# Patient Record
Sex: FEMALE | Race: WHITE | NOT HISPANIC OR LATINO | Employment: UNEMPLOYED | URBAN - NONMETROPOLITAN AREA
[De-identification: names, ages, dates, MRNs, and addresses within clinical notes are randomized per-mention and may not be internally consistent; named-entity substitution may affect disease eponyms.]

---
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
## 2023-06-09 ENCOUNTER — TRANSCRIBE ORDERS (OUTPATIENT)
Dept: ADMINISTRATIVE | Facility: HOSPITAL | Age: 18
End: 2023-06-09

## 2023-06-09 DIAGNOSIS — R10.9 ABDOMINAL PAIN, UNSPECIFIED ABDOMINAL LOCATION: ICD-10-CM

## 2023-06-09 DIAGNOSIS — R11.0 NAUSEA: Primary | ICD-10-CM

## 2023-08-02 ENCOUNTER — HOSPITAL ENCOUNTER (OUTPATIENT)
Dept: ULTRASOUND IMAGING | Facility: HOSPITAL | Age: 18
Discharge: HOME | End: 2023-08-02
Admitting: FAMILY MEDICINE
Payer: MEDICAID

## 2023-08-02 DIAGNOSIS — R10.9 ABDOMINAL PAIN, UNSPECIFIED ABDOMINAL LOCATION: ICD-10-CM

## 2023-08-02 DIAGNOSIS — R11.0 NAUSEA: ICD-10-CM

## 2023-08-02 PROCEDURE — 76705 ECHO EXAM OF ABDOMEN: CPT

## 2023-08-17 ENCOUNTER — OFFICE VISIT (OUTPATIENT)
Dept: GASTROENTEROLOGY | Facility: CLINIC | Age: 18
End: 2023-08-17
Payer: MEDICAID

## 2023-08-17 VITALS
HEIGHT: 63 IN | DIASTOLIC BLOOD PRESSURE: 62 MMHG | HEART RATE: 74 BPM | WEIGHT: 114 LBS | BODY MASS INDEX: 20.2 KG/M2 | RESPIRATION RATE: 18 BRPM | OXYGEN SATURATION: 98 % | SYSTOLIC BLOOD PRESSURE: 94 MMHG | TEMPERATURE: 98.3 F

## 2023-08-17 DIAGNOSIS — R11.0 NAUSEA: Chronic | ICD-10-CM

## 2023-08-17 DIAGNOSIS — R10.13 EPIGASTRIC PAIN: Primary | Chronic | ICD-10-CM

## 2023-08-17 PROCEDURE — 99204 OFFICE O/P NEW MOD 45 MIN: CPT | Performed by: NURSE PRACTITIONER

## 2023-08-17 PROCEDURE — 1159F MED LIST DOCD IN RCRD: CPT | Performed by: NURSE PRACTITIONER

## 2023-08-17 PROCEDURE — 1160F RVW MEDS BY RX/DR IN RCRD: CPT | Performed by: NURSE PRACTITIONER

## 2023-08-17 RX ORDER — ESCITALOPRAM OXALATE 20 MG/1
1 TABLET ORAL DAILY
COMMUNITY
Start: 2023-08-04

## 2023-08-17 RX ORDER — PANTOPRAZOLE SODIUM 40 MG/1
40 TABLET, DELAYED RELEASE ORAL DAILY
COMMUNITY

## 2023-08-17 RX ORDER — FAMOTIDINE 40 MG/1
1 TABLET, FILM COATED ORAL NIGHTLY PRN
COMMUNITY
Start: 2023-07-10

## 2023-08-17 NOTE — PATIENT INSTRUCTIONS
Antireflux measures: Avoid fried, fatty foods, alcohol, chocolate, coffee, tea,  soft drinks, all carbonated beverages (including sparkling water), peppermint and spearmint, spicy foods, tomatoes and tomato based foods, onions, peppers, and garlic.   Other antireflux measures include weight reduction if overweight, avoiding tight clothing around the abdomen, elevating the head of the bed 6 inches with blocks under the head board, and don't drink or eat before going to bed and avoid lying down immediately after meals.  Pantoprazole 40 mg 1 by mouth in the am 30 minutes before breakfast.  Patient declines Zofran at this time.   Follow up: 6 months or sooner if needed

## 2023-08-17 NOTE — PROGRESS NOTES
New Patient Consult      Date: 2023   Patient Name: Antonella Tamayo  MRN: [id number]  : 2005     Primary Care Provider: Morteza Alberto MD    Chief Complaint   Patient presents with    Abdominal Pain     History of Present Illness: Antonella Tamayo is a 17 y.o. female who is here today to establish care with gastroenterology for abdominal pain.     She has a history of epigastric pain that started 4 months ago that has now improved. She was started on Pantoprazole 40 mg once a day in May and her symptoms are much better.  She is now having the pain about once a month that may last 1 day or so. Eating certain foods, such as spicy foods, made symptoms worse. She had nausea associated with the pain, no vomiting. Denies reflux. No history of reflux. Denies any GI bleeding. She states she had a negative H. Pylori breath test at her PCP but we do not have those results. Denies any NSAID use.     Denies constipation, diarrhea or rectal bleeding. No family history of GI malignancy.     Subjective      Past Medical History:   Diagnosis Date    Asthma     REPORTS NO USE OF INHALERS. GRANDMOTHER REPORTS NO S/S SINCE 3 YEARS OF AGE.    GERD (gastroesophageal reflux disease)     Hypoglycemia     Meningitis 2015    Sickle cell trait     Spinal headache     AFTER SPINAL TAP IN  (HX MENINGITIS)    Sprain     left ankle     Past Surgical History:   Procedure Laterality Date    BURSECTOMY Right 2017    Procedure: OPEN PREPATELLAR BURSECTOMY AND REPAIR RIGHT;  Surgeon: Jevon Braga MD;  Location: [facility];  Service:     EAR TUBES      OTHER SURGICAL HISTORY      SPINAL TAP    TYMPANOPLASTY Right     X2     Family History   Problem Relation Age of Onset    Scoliosis Maternal Grandfather     Osteoarthritis Other     Osteoporosis Other     Asthma Other     COPD Other     Abnormal EKG Other     Colon cancer Neg Hx      Social History     Socioeconomic History    Marital status:  Single   Tobacco Use    Smoking status: Never     Passive exposure: Never    Smokeless tobacco: Never   Vaping Use    Vaping Use: Never used   Substance and Sexual Activity    Alcohol use: No    Drug use: No    Sexual activity: Never       Current Outpatient Medications:     escitalopram (LEXAPRO) 20 MG tablet, Take 1 tablet by mouth Daily., Disp: , Rfl:     famotidine (PEPCID) 40 MG tablet, Take 1 tablet by mouth At Night As Needed for Indigestion., Disp: , Rfl:     pantoprazole (PROTONIX) 40 MG EC tablet, Take 1 tablet by mouth Daily., Disp: , Rfl:    No Known Allergies  The following portions of the patient's history were reviewed and updated as appropriate: allergies, current medications, past family history, past medical history, past social history, past surgical history and problem list.    Objective     Physical Exam  Vitals and nursing note reviewed.   Constitutional:       General: She is not in acute distress.     Appearance: Normal appearance. She is well-developed.   HENT:      Head: Normocephalic and atraumatic.      Mouth/Throat:      Mouth: Mucous membranes are not pale, not dry and not cyanotic.   Eyes:      General: Lids are normal.   Neck:      Trachea: Trachea normal.   Cardiovascular:      Rate and Rhythm: Normal rate.   Pulmonary:      Effort: Pulmonary effort is normal. No respiratory distress.      Breath sounds: Normal breath sounds.   Abdominal:      General: Bowel sounds are normal.      Palpations: Abdomen is soft. There is no mass.      Tenderness: There is no abdominal tenderness.      Hernia: No hernia is present.   Skin:     General: Skin is warm and dry.   Neurological:      Mental Status: She is alert and oriented to person, place, and time.   Psychiatric:         Mood and Affect: Mood normal.         Speech: Speech normal.         Behavior: Behavior normal. Behavior is cooperative.     Vitals:    08/17/23 0854   BP: 94/62   Pulse: 74   Resp: 18   Temp: 98.3 øF (36.8 øC)   SpO2: 98%  "  Weight: 51.7 kg (114 lb)   Height: 160 cm (63\")     Body mass index is 20.19 kg/mý.     Results Review:   I have reviewed the patient's new clinical and imaging results.    No visits with results within 90 Day(s) from this visit.   Latest known visit with results is:   Admission on 04/27/2017, Discharged on 04/27/2017   Component Date Value Ref Range Status    HCG, Urine QL 04/27/2017 Negative  Negative Final    Case Report 04/27/2017    Final                    Value:Surgical Pathology Report                         Case: [id number]                                  Authorizing Provider:  Jevon Braga MD      Collected:           04/27/2017 09:51 AM          Ordering Location:     [facility] OR Received:            04/27/2017 10:32 AM          Pathologist:           Porfirio Nielsen MD                                                         Specimen:    Knee, Right, right patella bursa injury                                                    Final Diagnosis 04/27/2017    Final                    Value:This result contains rich text formatting which cannot be displayed here.      US Gallbladder     Result Date: 8/2/2023  Normal right upper quadrant ultrasound.      I       Assessment / Plan      1. Epigastric pain  2. Nausea  She was started on Pantoprazole 40 mg daily and her symptoms have improved. She may have the pain and nausea once a month if she has spicy foods. Denies any GI bleeding. She states she had negative H. Pylori breath test at PCP office but we do not have those results. US 8/2/2023 unremarkable.  Will obtain copies of lab results.   Continue Pantoprazole 40 mg daily for now. Will decrease dose/discontinue at follow up depending on symptoms.  Patient declines Zofran at this time as she states she does not need it.   EGD in the future if symptoms worsen or has GI bleeding.     Patient Instructions   Antireflux measures: Avoid fried, fatty foods, alcohol, chocolate, coffee, tea, "  soft drinks, all carbonated beverages (including sparkling water), peppermint and spearmint, spicy foods, tomatoes and tomato based foods, onions, peppers, and garlic.   Other antireflux measures include weight reduction if overweight, avoiding tight clothing around the abdomen, elevating the head of the bed 6 inches with blocks under the head board, and don't drink or eat before going to bed and avoid lying down immediately after meals.  Pantoprazole 40 mg 1 by mouth in the am 30 minutes before breakfast.  Patient declines Zofran at this time.   Follow up: 6 months or sooner if needed     Madelyn Osullivan, APRN  8/17/2023    Please note that portions of this note may have been completed with a voice recognition program.

## 2023-09-25 DIAGNOSIS — M25.561 ARTHRALGIA OF RIGHT KNEE: Primary | ICD-10-CM

## 2023-09-26 ENCOUNTER — OFFICE VISIT (OUTPATIENT)
Dept: ORTHOPEDIC SURGERY | Facility: CLINIC | Age: 18
End: 2023-09-26
Payer: MEDICAID

## 2023-09-26 VITALS
BODY MASS INDEX: 19.77 KG/M2 | HEART RATE: 75 BPM | SYSTOLIC BLOOD PRESSURE: 112 MMHG | DIASTOLIC BLOOD PRESSURE: 65 MMHG | WEIGHT: 111.6 LBS | HEIGHT: 63 IN

## 2023-09-26 DIAGNOSIS — M22.2X1 PATELLOFEMORAL DISORDER OF RIGHT KNEE: ICD-10-CM

## 2023-09-26 DIAGNOSIS — R29.898 SENSATION OF KNEE INSTABILITY: Primary | ICD-10-CM

## 2023-09-26 DIAGNOSIS — M25.561 ACUTE PAIN OF RIGHT KNEE: ICD-10-CM

## 2023-09-26 PROCEDURE — 1159F MED LIST DOCD IN RCRD: CPT | Performed by: PHYSICIAN ASSISTANT

## 2023-09-26 PROCEDURE — 1160F RVW MEDS BY RX/DR IN RCRD: CPT | Performed by: PHYSICIAN ASSISTANT

## 2023-09-26 PROCEDURE — 99203 OFFICE O/P NEW LOW 30 MIN: CPT | Performed by: PHYSICIAN ASSISTANT

## 2023-09-26 NOTE — PROGRESS NOTES
Subjective   Patient ID: Antonella Tamayo is a 18 y.o. left hand dominant  female  Pain of the Right Knee (Reports pain with exercise and knee feels unstable, no known new injury )         Pain  Associated symptoms include arthralgias (right knee).   Patient presents as a new patient with complaints of right anterior knee pain that is made worse with leg lifts and exercising.  She often senses instability of the right knee.  No injury or trauma.  No redness or warmth.  Symptoms have been ongoing for the last 6 months.                                                 Past Medical History:   Diagnosis Date    Asthma     REPORTS NO USE OF INHALERS. GRANDMOTHER REPORTS NO S/S SINCE 3 YEARS OF AGE.    GERD (gastroesophageal reflux disease)     Hypoglycemia     Meningitis 2015    Sickle cell trait     Spinal headache     AFTER SPINAL TAP IN 2015 (HX MENINGITIS)    Sprain     left ankle        Past Surgical History:   Procedure Laterality Date    BURSECTOMY Right 4/27/2017    Procedure: OPEN PREPATELLAR BURSECTOMY AND REPAIR RIGHT;  Surgeon: Jevon Braga MD;  Location: [facility];  Service:     EAR TUBES      OTHER SURGICAL HISTORY  2015    SPINAL TAP    TYMPANOPLASTY Right     X2       Family History   Problem Relation Age of Onset    Scoliosis Maternal Grandfather     Osteoarthritis Other     Osteoporosis Other     Asthma Other     COPD Other     Abnormal EKG Other     Colon cancer Neg Hx        Social History     Socioeconomic History    Marital status: Single   Tobacco Use    Smoking status: Never     Passive exposure: Never    Smokeless tobacco: Never   Vaping Use    Vaping Use: Never used   Substance and Sexual Activity    Alcohol use: No    Drug use: No    Sexual activity: Never         Current Outpatient Medications:     escitalopram (LEXAPRO) 20 MG tablet, Take 1 tablet by mouth Daily., Disp: , Rfl:     famotidine (PEPCID) 40 MG tablet, Take 1 tablet by mouth At Night As Needed for Indigestion., Disp: ,  "Rfl:     pantoprazole (PROTONIX) 40 MG EC tablet, Take 1 tablet by mouth Daily., Disp: , Rfl:     No Known Allergies    Review of Systems   Musculoskeletal:  Positive for arthralgias (right knee).     I have reviewed the medical and surgical history, family history, social history, medications, and/or allergies, and the review of systems of this report.    Objective   /65 (BP Location: Right arm, Patient Position: Sitting, Cuff Size: Adult)   Pulse 75   Ht 160 cm (63\")   Wt 50.6 kg (111 lb 9.6 oz)   BMI 19.77 kg/m²    Physical Exam  Vitals and nursing note reviewed.   Constitutional:       Appearance: Normal appearance.   Pulmonary:      Effort: Pulmonary effort is normal.   Musculoskeletal:      Right hip: No deformity. Normal range of motion.      Right knee: Bony tenderness and crepitus present. No deformity, erythema or ecchymosis. No LCL laxity or MCL laxity. Abnormal patellar mobility. Normal alignment and normal meniscus.      Right lower leg: No deformity or tenderness.   Neurological:      Mental Status: She is alert and oriented to person, place, and time.     Right Knee Exam     Range of Motion   Extension:  5   Flexion:  130     Tests   Lachman:  Anterior - trace      Pivot shift: trace    Other   Erythema: absent  Scars: present  Pulse: present         Extremity DVT signs are negative on physical exam with negative Hunter sign, no calf pain, no palpable cords, and no skin tone change   Neurologic Exam     Mental Status   Oriented to person, place, and time.             Assessment & Plan   Independent Review of Radiographic Studies:    X-ray of the right knee 3 view performed in the clinic independently reviewed for the evaluation of knee pain.  No comparison films are available for review.  No acute fracture or dislocation noted.  There is patellofemoral narrowing along the inferior aspect of the patellofemoral joint      Procedures       Diagnoses and all orders for this visit:    1. Sensation " of knee instability (Primary)  -     MRI Knee Right Without Contrast; Future    2. Acute pain of right knee  -     MRI Knee Right Without Contrast; Future    3. Patellofemoral disorder of right knee  -     MRI Knee Right Without Contrast; Future       Orthopedic activities reviewed and patient expressed appreciation  Discussion of orthopedic goals  Risk, benefits, and merits of treatment alternatives reviewed with the patient and questions answered    Recommendations/Plan:  Exercise, medications, injections, other patient advice, and return appointment as noted.  Patient is encouraged to call or return for any issues or concerns.    Due to the patient's complaint of a sensation of instability, will order MRI.  Avoid leg lifts and squats until we obtain the MRI and you have reviewed the results with our clinic.      Patient agreeable to call or return sooner for any concerns.                 EMR Dragon-transcription disclaimer:  This encounter note is an electronic transcription of spoken language to printed text.  Electronic transcription of spoken language may permit erroneous or at times nonsensical words or phrases to be inadvertently transcribed.  Although I have reviewed the note for such errors, some may still exist

## 2023-10-05 ENCOUNTER — PATIENT ROUNDING (BHMG ONLY) (OUTPATIENT)
Dept: ORTHOPEDIC SURGERY | Facility: CLINIC | Age: 18
End: 2023-10-05
Payer: MEDICAID

## 2023-10-05 NOTE — PROGRESS NOTES
A [employer] message has been sent to the patient for patient rounding with [facility].

## 2023-10-30 ENCOUNTER — OFFICE VISIT (OUTPATIENT)
Dept: INTERNAL MEDICINE | Facility: CLINIC | Age: 18
End: 2023-10-30
Payer: MEDICAID

## 2023-10-30 VITALS
DIASTOLIC BLOOD PRESSURE: 62 MMHG | TEMPERATURE: 98.6 F | BODY MASS INDEX: 20.02 KG/M2 | OXYGEN SATURATION: 98 % | SYSTOLIC BLOOD PRESSURE: 112 MMHG | HEART RATE: 79 BPM | HEIGHT: 63 IN | WEIGHT: 113 LBS

## 2023-10-30 DIAGNOSIS — Z30.011 ENCOUNTER FOR INITIAL PRESCRIPTION OF CONTRACEPTIVE PILLS: ICD-10-CM

## 2023-10-30 DIAGNOSIS — Z13.29 SCREENING FOR ENDOCRINE, METABOLIC AND IMMUNITY DISORDER: ICD-10-CM

## 2023-10-30 DIAGNOSIS — Z13.220 SCREENING FOR CHOLESTEROL LEVEL: ICD-10-CM

## 2023-10-30 DIAGNOSIS — Z13.0 SCREENING FOR DEFICIENCY ANEMIA: ICD-10-CM

## 2023-10-30 DIAGNOSIS — Z13.228 SCREENING FOR ENDOCRINE, METABOLIC AND IMMUNITY DISORDER: ICD-10-CM

## 2023-10-30 DIAGNOSIS — Z11.4 SCREENING FOR HIV WITHOUT PRESENCE OF RISK FACTORS: ICD-10-CM

## 2023-10-30 DIAGNOSIS — Z13.0 SCREENING FOR ENDOCRINE, METABOLIC AND IMMUNITY DISORDER: ICD-10-CM

## 2023-10-30 DIAGNOSIS — Z00.00 ANNUAL PHYSICAL EXAM: Primary | ICD-10-CM

## 2023-10-30 DIAGNOSIS — Z76.89 ENCOUNTER TO ESTABLISH CARE: Primary | ICD-10-CM

## 2023-10-30 DIAGNOSIS — Z11.59 ENCOUNTER FOR HEPATITIS C SCREENING TEST FOR LOW RISK PATIENT: ICD-10-CM

## 2023-10-30 LAB
B-HCG UR QL: NEGATIVE
EXPIRATION DATE: NORMAL
INTERNAL NEGATIVE CONTROL: NEGATIVE
INTERNAL POSITIVE CONTROL: POSITIVE
Lab: NORMAL

## 2023-10-30 PROCEDURE — 99213 OFFICE O/P EST LOW 20 MIN: CPT | Performed by: NURSE PRACTITIONER

## 2023-10-30 PROCEDURE — 1160F RVW MEDS BY RX/DR IN RCRD: CPT | Performed by: NURSE PRACTITIONER

## 2023-10-30 PROCEDURE — 81025 URINE PREGNANCY TEST: CPT | Performed by: NURSE PRACTITIONER

## 2023-10-30 PROCEDURE — 1159F MED LIST DOCD IN RCRD: CPT | Performed by: NURSE PRACTITIONER

## 2023-10-30 NOTE — PROGRESS NOTES
Acute Office Visit      Date: 10/30/2023   Patient Name: Antonella Abernathy  : 2005   MRN: [id number]     Chief Complaint:    Chief Complaint   Patient presents with    [facility]     With Kayleen lorenzo    Contraception       History of Present Illness: Antonella Abernathy is a 18 y.o. female presenting to [facility].   Works at [employer] off of [address] in [place]   Wanting to start oral contraceptives. Not interested in nexplanon or IUD.  No personal hx or family history of hematological disorders. She has never had DVT, MI, CVA. Nonsmoker.   Never been on birth control before. Periods are regular. Last period 1 week ago, ended 10/27.  She is sexually active with male partner and wants to take for pregnancy prevention.   Does not have gynecologist. Pap not due until next September when she is 19.     Subjective      Review of Systems:   Pertinent ROS noted in HPI.     I have reviewed the patients family history, social history, past medical history, past surgical history and have updated it as appropriate.     Medications:     Current Outpatient Medications:     escitalopram (LEXAPRO) 20 MG tablet, Take 1 tablet by mouth Daily., Disp: , Rfl:     pantoprazole (PROTONIX) 40 MG EC tablet, Take 1 tablet by mouth Daily., Disp: , Rfl:     Norethin-Eth Estrad-Fe Biphas (LO LOESTRIN FE) 1 MG-10 MCG / 10 MCG tablet, Take 1 tablet by mouth Daily., Disp: 84 tablet, Rfl: 3    Allergies:   No Known Allergies    Objective     Physical Exam  Physical Exam  Vitals and nursing note reviewed.   Constitutional:       General: She is not in acute distress.     Appearance: Normal appearance.   HENT:      Right Ear: External ear normal.      Left Ear: External ear normal.   Eyes:      Extraocular Movements: Extraocular movements intact.      Pupils: Pupils are equal, round, and reactive to light.   Cardiovascular:      Rate and Rhythm: Normal rate and regular rhythm.      Heart sounds: Normal heart  "sounds.   Pulmonary:      Effort: Pulmonary effort is normal.      Breath sounds: Normal breath sounds.   Musculoskeletal:         General: Normal range of motion.      Cervical back: Normal range of motion.   Skin:     General: Skin is dry.   Neurological:      Mental Status: She is alert and oriented to person, place, and time.   Psychiatric:         Mood and Affect: Mood normal.         Thought Content: Thought content normal.         Vital Signs:   Vitals:    10/30/23 1645   BP: 112/62   Pulse: 79   Temp: 98.6 °F (37 °C)   SpO2: 98%   Weight: 51.3 kg (113 lb)   Height: 160 cm (63\")   PainSc: 0-No pain     Body mass index is 20.02 kg/m².    Office Visit on 10/30/2023   Component Date Value Ref Range Status    HCG, Urine, QL 10/30/2023 Negative  Negative Final    Lot Number 10/30/2023 [id number]   Final    Internal Positive Control 10/30/2023 Positive  Positive, Passed Final    Internal Negative Control 10/30/2023 Negative  Negative, Passed Final    Expiration Date 10/30/2023 1/3/25   Final        Assessment / Plan      Assessment/Plan:   Problem List Items Addressed This Visit    None  Visit Diagnoses       Encounter to establish care    -  Primary    Encounter for initial prescription of contraceptive pills        Relevant Medications    Norethin-Eth Estrad-Fe Biphas (LO LOESTRIN FE) 1 MG-10 MCG / 10 MCG tablet    Other Relevant Orders    POCT pregnancy, urine (Completed)            UPT negative. Discussed how to take birth control pills, take at the same time each day.  If one dose is missed take ASAP, does not require a back up method. If 2 or more doses are missed please use back up method such as condoms for at least 7 days.   Some medications can decrease the effectiveness of OCP such as certain antibiotics. A backup method such as condoms during treatment is necessary for adequate pregnancy protection.   Discussed risks of OCPs including deep vein thrombosis, pulmonary embolism, heart attack, and stroke. " Discussed s/s of these conditions and when to be seen emergently.   Discussed with patient birth control does not protect against sexually transmitted diseases and safe sex practices are encouraged.       Follow Up:   Return for Annual.        Kayleen NUNEZ  [facility]

## 2023-11-06 RX ORDER — MECLIZINE HYDROCHLORIDE 25 MG/1
25 TABLET ORAL 3 TIMES DAILY PRN
Qty: 30 TABLET | Refills: 0 | Status: SHIPPED | OUTPATIENT
Start: 2023-11-06

## 2023-11-08 ENCOUNTER — HOSPITAL ENCOUNTER (OUTPATIENT)
Dept: MRI IMAGING | Facility: HOSPITAL | Age: 18
Discharge: HOME | End: 2023-11-08
Admitting: PHYSICIAN ASSISTANT
Payer: COMMERCIAL

## 2023-11-08 DIAGNOSIS — R29.898 SENSATION OF KNEE INSTABILITY: ICD-10-CM

## 2023-11-08 DIAGNOSIS — M22.2X1 PATELLOFEMORAL DISORDER OF RIGHT KNEE: ICD-10-CM

## 2023-11-08 DIAGNOSIS — M25.561 ACUTE PAIN OF RIGHT KNEE: ICD-10-CM

## 2023-11-08 PROCEDURE — 73721 MRI JNT OF LWR EXTRE W/O DYE: CPT

## 2023-11-13 ENCOUNTER — TELEPHONE (OUTPATIENT)
Dept: ORTHOPEDIC SURGERY | Facility: CLINIC | Age: 18
End: 2023-11-13
Payer: COMMERCIAL

## 2023-11-13 NOTE — PROGRESS NOTES
Would you mind to let patient know that her MRI of right knee does NOT reveal any abnormalities. Her pain could benefit from Physical therapy. If she is interested in PT, could you place and fax the PT order?  thanks

## 2023-11-18 ENCOUNTER — LAB (OUTPATIENT)
Dept: LAB | Facility: HOSPITAL | Age: 18
End: 2023-11-18
Payer: COMMERCIAL

## 2023-11-18 PROCEDURE — 80061 LIPID PANEL: CPT | Performed by: NURSE PRACTITIONER

## 2023-11-18 PROCEDURE — 87389 HIV-1 AG W/HIV-1&-2 AB AG IA: CPT | Performed by: NURSE PRACTITIONER

## 2023-11-18 PROCEDURE — 80050 GENERAL HEALTH PANEL: CPT | Performed by: NURSE PRACTITIONER

## 2023-11-18 PROCEDURE — 86803 HEPATITIS C AB TEST: CPT | Performed by: NURSE PRACTITIONER

## 2023-11-21 ENCOUNTER — OFFICE VISIT (OUTPATIENT)
Dept: INTERNAL MEDICINE | Facility: CLINIC | Age: 18
End: 2023-11-21
Payer: COMMERCIAL

## 2023-11-21 VITALS
DIASTOLIC BLOOD PRESSURE: 62 MMHG | OXYGEN SATURATION: 98 % | HEIGHT: 63 IN | TEMPERATURE: 98.1 F | HEART RATE: 83 BPM | SYSTOLIC BLOOD PRESSURE: 116 MMHG | BODY MASS INDEX: 20.02 KG/M2 | WEIGHT: 113 LBS

## 2023-11-21 DIAGNOSIS — R10.13 EPIGASTRIC PAIN: ICD-10-CM

## 2023-11-21 DIAGNOSIS — Z30.011 ENCOUNTER FOR INITIAL PRESCRIPTION OF CONTRACEPTIVE PILLS: ICD-10-CM

## 2023-11-21 DIAGNOSIS — R11.0 NAUSEA: ICD-10-CM

## 2023-11-21 DIAGNOSIS — Z00.00 ANNUAL PHYSICAL EXAM: Primary | ICD-10-CM

## 2023-11-21 PROCEDURE — 99395 PREV VISIT EST AGE 18-39: CPT | Performed by: NURSE PRACTITIONER

## 2023-11-21 RX ORDER — ACETAMINOPHEN AND CODEINE PHOSPHATE 120; 12 MG/5ML; MG/5ML
1 SOLUTION ORAL DAILY
Qty: 28 TABLET | Refills: 12 | Status: SHIPPED | OUTPATIENT
Start: 2023-11-21 | End: 2024-11-20

## 2023-11-21 RX ORDER — PANTOPRAZOLE SODIUM 20 MG/1
20 TABLET, DELAYED RELEASE ORAL DAILY
Qty: 90 TABLET | Refills: 0 | Status: SHIPPED | OUTPATIENT
Start: 2023-11-21 | End: 2023-11-22

## 2023-11-21 NOTE — PROGRESS NOTES
Female Physical Note      Date: 2023   Patient Name: Antonella Abernathy  : 2005   MRN: [id number]     Chief Complaint:    Chief Complaint   Patient presents with    Annual Exam    Abdominal Pain     4 out of 5 days last week, thinks it may be from her ulcer.        History of Present Illness: Antonella Abernathy is a 18 y.o. female who is here today for their annual health maintenance and physical.  Epigastric abdominal pain x 1 week. Recently stopped her pantoprazole and a few weeks later started having the epigastric pain again. Hx ulcer. Occurring within 1 hour after meals. Nausea and occasionally vomiting. If she vomits the abdominal pain is relieved. Sometimes hurts into her back. Never had egd but this was discussed before. Has not had HIDA scan. No fever, myalgia, chills, diarrhea, constipation.   Attempted to take ELENI Lo Loestrin and had dizziness, vertigo, mood swings within the initial first couple weeks. When stopped her symptoms resolved. Interested in trying progesterone only pill.   Anxiety is stable on lexapro 20 mg daily.   UTD vision/dental   Declines STI testing  Declines vaccines     Subjective      Review of Systems:   Review of Systems   Gastrointestinal:  Positive for abdominal pain, nausea and vomiting.   All other systems reviewed and are negative.      Past Medical History, Social History, Family History and Care Team were all reviewed with patient and updated as appropriate.     Medications:     Current Outpatient Medications:     pantoprazole (PROTONIX) 20 MG EC tablet, Take 1 tablet by mouth Daily., Disp: 90 tablet, Rfl: 0    escitalopram (LEXAPRO) 20 MG tablet, Take 1 tablet by mouth Daily., Disp: , Rfl:     norethindrone (MICRONOR) 0.35 MG tablet, Take 1 tablet by mouth Daily., Disp: 28 tablet, Rfl: 12    Allergies:   No Known Allergies    Immunizations:  Immunization History   Administered Date(s) Administered    COVID-19 (PFIZER) Purple Cap Monovalent  "03/29/2021    DTaP, Unspecified 2003, 01/16/2004, 03/26/2004, 03/22/2005, 10/03/2007    Fluzone (or Fluarix & Flulaval for VFC) >6mos 05/03/2023    Hep A, 2 Dose 01/10/2019, 02/18/2020    Hep B, Adolescent or Pediatric 2005, 2005, 03/26/2004    HiB 2005, 01/16/2004, 03/26/2004, 03/22/2005    MMR 12/22/2004, 10/03/2007    Meningococcal B,(Bexsero) 02/18/2020    Meningococcal MCV4P (Menactra) 02/18/2020    Meningococcal, Unspecified 10/22/2014    Pneumococcal, Unspecified 2003, 01/16/2004, 06/29/2004, 12/22/2004    Polio, Unspecified 2003, 01/16/2004, 09/21/2006, 10/03/2007    Tdap 10/22/2014    Varicella 03/22/2005, 10/03/2007         Hep C (Age 18-79 once):  previously negative  HIV (Age 15-65 once): previously negative  Lipid panel: done    Ophthalmologist: established  Dentist: established    Tobacco Use: Low Risk  (11/21/2023)    Patient History     Smoking Tobacco Use: Never     Smokeless Tobacco Use: Never     Passive Exposure: Never       Social History     Substance and Sexual Activity   Alcohol Use Yes    Alcohol/week: 2.0 standard drinks of alcohol    Types: 2 Drinks containing 0.5 oz of alcohol per week        Social History     Substance and Sexual Activity   Drug Use No        Diet/Physical activity: counseled on 11/21/23      Sexual Health: using contraception with bf, not attempting pregnancy   Menopause: pre-menopausal  Menstrual Cycles: regular, LMP: 11/19/23    PHQ-2 Depression Screening  Little interest or pleasure in doing things? 0-->not at all   Feeling down, depressed, or hopeless? 0-->not at all   PHQ-2 Total Score 0     PHQ-9 Total Score: 0       Objective     Physical Exam:  Vital Signs:   Vitals:    11/21/23 1605   BP: 116/62   Pulse: 83   Temp: 98.1 °F (36.7 °C)   SpO2: 98%   Weight: 51.3 kg (113 lb)   Height: 160 cm (63\")   PainSc: 0-No pain     Body mass index is 20.02 kg/m².   BMI is within normal parameters. No other follow-up for BMI required.   "     Physical Exam  Vitals and nursing note reviewed.   Constitutional:       General: She is not in acute distress.     Appearance: Normal appearance. She is normal weight.   HENT:      Head: Normocephalic and atraumatic.      Right Ear: Tympanic membrane, ear canal and external ear normal.      Left Ear: Tympanic membrane, ear canal and external ear normal.      Nose: Nose normal.      Mouth/Throat:      Mouth: Mucous membranes are moist.      Pharynx: Oropharynx is clear.   Eyes:      General: No scleral icterus.     Extraocular Movements: Extraocular movements intact.      Conjunctiva/sclera: Conjunctivae normal.      Pupils: Pupils are equal, round, and reactive to light.   Neck:      Thyroid: No thyromegaly.   Cardiovascular:      Rate and Rhythm: Normal rate and regular rhythm.      Pulses: Normal pulses.   Pulmonary:      Effort: Pulmonary effort is normal.      Breath sounds: Normal breath sounds.   Abdominal:      General: Abdomen is flat. Bowel sounds are normal. There is no distension.      Palpations: Abdomen is soft.      Tenderness: There is no abdominal tenderness. There is no guarding.   Musculoskeletal:         General: Normal range of motion.      Cervical back: Normal range of motion and neck supple.   Lymphadenopathy:      Cervical: No cervical adenopathy.   Skin:     General: Skin is warm and dry.      Coloration: Skin is not jaundiced or pale.      Findings: No rash.   Neurological:      Mental Status: She is alert and oriented to person, place, and time.      Cranial Nerves: No cranial nerve deficit.      Motor: No weakness.      Coordination: Coordination normal.      Gait: Gait normal.   Psychiatric:         Mood and Affect: Mood normal.         Behavior: Behavior normal.         Thought Content: Thought content normal.           Assessment / Plan      Assessment/Plan:   Problem List Items Addressed This Visit          Gastrointestinal Abdominal     Epigastric pain    Relevant Medications     pantoprazole (PROTONIX) 20 MG EC tablet    Nausea    Relevant Medications    pantoprazole (PROTONIX) 20 MG EC tablet     Other Visit Diagnoses       Annual physical exam    -  Primary    Encounter for initial prescription of contraceptive pills        Relevant Medications    norethindrone (MICRONOR) 0.35 MG tablet            Healthcare Maintenance:  Counseling provided based on age appropriate USPSTF guidelines.  BMI is within normal parameters. No other follow-up for BMI required.  Labs reviewed with patient and all within normal range.   Reinitiate pantoprazole 20 mg every morning 30 minutes before meals x 6 weeks. If helps more likely GERD/gastric ulcer.  If symptoms persist/worsen consider HIDA scan. Gallbladder US normal.   Failed low dose ELENI Lo Loestrin due to side effects. Start progesterone only, norethidrone daily. Notify/stop if any side effects.   Discussed how to take birth control pills, take at the same time each day.  If one dose is missed take ASAP, does not require a back up method. If 2 or more doses are missed please use back up method such as condoms for at least 7 days.   Some medications can decrease the effectiveness of OCP such as certain antibiotics. A backup method such as condoms during treatment is necessary for adequate pregnancy protection.   Discussed risks of OCPs including deep vein thrombosis, pulmonary embolism, heart attack, and stroke.   Discussed with patient birth control does not protect against sexually transmitted diseases.      Antonella Abernathy voices understanding and acceptance of this advice and will call back with any further questions or concerns. AVS with preventive healthcare tips printed for patient.       Follow Up:   Return in about 1 year (around 11/21/2024) for Annual with pap.      ENRIQUE Sampson  [facility] [place]

## 2023-11-22 RX ORDER — OMEPRAZOLE 20 MG/1
20 CAPSULE, DELAYED RELEASE ORAL DAILY
Qty: 90 CAPSULE | Refills: 0 | Status: SHIPPED | OUTPATIENT
Start: 2023-11-22

## 2023-12-10 DIAGNOSIS — R00.2 PALPITATION: Primary | ICD-10-CM

## 2023-12-18 PROBLEM — R00.2 PALPITATIONS: Status: ACTIVE | Noted: 2023-12-18

## 2023-12-18 NOTE — PROGRESS NOTES
[facility]  Cardiology Consultation Note     Antonella Abernathy  2005  Requesting Provider: ENRIQUE Fry  PCP: Kayleen Moore APRN    ID:  Antonella Abernathy is a 18 y.o. female who resides in [place], [place]    REASON FOR CONSULTATION:    Palpitations  Chest Pain         Dear ENRIQUE Sampson:    Thank you for referring Amy Yanes to me for evaluation of palpitations.  As you know she is a 18-year-old female with no prior cardiac history but history of anxiety/depression and gastric ulcer whom you saw last month.  She reported upper epigastric discomfort and you started her on a PPI.  She tells me this has resolved her symptoms.  She reports having COVID in  and afterwards would have palpitations.  Her episodes would feel like her heart is racing and take her breath.  This eventually went away.  However, for the last few months her palpitations have returned.  They were occurring almost daily.  They would last for a few seconds and take her breath then subside.  She says since her appointment was made with cardiology her palpitations have improved.  She is only had a couple of episodes over the past few weeks.  She denies any lightheadedness, shortness of breath, chest pain, orthopnea, or syncope.  She reports her grandmother  of a pulmonary embolism and her grandfather has hypertension but there is no coronary artery disease noted in the family.  She does not smoke cigarettes.  She has not been to the gym lately but usually go several times a week and walks on the treadmill and lifts light weights.  She does report that when she has been on the treadmill there are times that she has noted by her Apple watch that her heart rate will speed up to 140 bpm briefly then decrease back to 80 and 90 bpm.  She is not symptomatic when this occurs and does not have palpitations when it occurs.  Her Apple watch has never alerted her to an irregular  heart rhythm.  She is currently on birth control pill.  She had recent blood work and thyroid panel, CMP, lipid profile and CBC were all within normal limits.  Her EKG today shows normal sinus rhythm without any abnormalities.      Past Medical History, Past Surgical History, Family history, Social History, and Medications were all reviewed with the patient today and updated as necessary.       Current Outpatient Medications:     escitalopram (LEXAPRO) 20 MG tablet, Take 1 tablet by mouth Daily., Disp: , Rfl:     norethindrone (MICRONOR) 0.35 MG tablet, Take 1 tablet by mouth Daily., Disp: 28 tablet, Rfl: 12    omeprazole (priLOSEC) 20 MG capsule, Take 1 capsule by mouth Daily., Disp: 90 capsule, Rfl: 0    No Known Allergies      Past Medical History:   Diagnosis Date    Arrhythmia     I feel like i have heart palps/flutters    Asthma     REPORTS NO USE OF INHALERS. GRANDMOTHER REPORTS NO S/S SINCE 3 YEARS OF AGE.    GERD (gastroesophageal reflux disease)     Heart murmur     I had one when i was younger.    Hypoglycemia     Meningitis 2015    Peptic ulceration May 2023    Sickle cell trait     Spinal headache     AFTER SPINAL TAP IN 2015 (HX MENINGITIS)    Sprain     left ankle       Past Surgical History:   Procedure Laterality Date    BURSECTOMY Right 04/27/2017    Procedure: OPEN PREPATELLAR BURSECTOMY AND REPAIR RIGHT;  Surgeon: Jevon Braga MD;  Location: [facility];  Service:     EAR TUBES      OTHER SURGICAL HISTORY  2015    SPINAL TAP    TYMPANOPLASTY Right     X2       Family History   Problem Relation Age of Onset    Anxiety disorder Mother     Asthma Mother     Birth defects Brother         2 heart valves instead of 3    Arthritis Maternal Grandmother     Scoliosis Maternal Grandfather     Osteoarthritis Other     Osteoporosis Other     Asthma Other     COPD Other     Abnormal EKG Other     Colon cancer Neg Hx        Social History     Tobacco Use    Smoking status: Never     Passive exposure: Never  "   Smokeless tobacco: Never   Substance Use Topics    Alcohol use: Yes     Alcohol/week: 2.0 standard drinks of alcohol     Types: 2 Drinks containing 0.5 oz of alcohol per week       Review of Systems   Constitutional: Negative for malaise/fatigue.   Eyes:  Negative for vision loss in left eye and vision loss in right eye.   Cardiovascular:  Negative for chest pain, dyspnea on exertion, near-syncope, orthopnea, palpitations, paroxysmal nocturnal dyspnea and syncope.   Musculoskeletal:  Negative for myalgias.   Neurological:  Negative for brief paralysis, excessive daytime sleepiness, focal weakness, numbness, paresthesias and weakness.   All other systems reviewed and are negative.              /58 (BP Location: Right arm, Patient Position: Sitting, Cuff Size: Adult)   Pulse 77   Ht 157.5 cm (62\")   Wt 52.7 kg (116 lb 3.2 oz)   SpO2 98%   BMI 21.25 kg/m²        Physical Exam        ECG 12 Lead    Date/Time: 12/26/2023 11:02 AM  Performed by: Anna Rey APRN    Authorized by: Anna Rey APRN  Comparison: compared with previous ECG   Rhythm: sinus rhythm  BPM: 77    Clinical impression: normal ECG  Comments: QT/QTc 368/416 MS          Lab Results   Component Value Date    CHOL 127 11/18/2023    HDL 59 11/18/2023    LDL 54 11/18/2023    VLDL 14 11/18/2023     Lab Results   Component Value Date    GLUCOSE 89 11/18/2023    BUN 9 11/18/2023    CREATININE 0.93 11/18/2023    EGFR 90.4 11/18/2023    BCR 9.7 11/18/2023    K 4.2 11/18/2023    CO2 25.0 11/18/2023    CALCIUM 9.5 11/18/2023    ALBUMIN 4.5 11/18/2023    BILITOT 0.4 11/18/2023    AST 21 11/18/2023    ALT 12 11/18/2023     Lab Results   Component Value Date    WBC 6.94 11/18/2023    HGB 13.0 11/18/2023    HCT 38.6 11/18/2023    MCV 86.5 11/18/2023     11/18/2023     No results found for: \"HGBA1C\"         Diagnoses and all orders for this visit:    1. Palpitations (Primary)  Assessment & Plan:  Obtain echocardiogram  Defer monitor " for now but if palpitations return, progress, worsen or patient becomes symptomatic we will place 2-week monitor.  She will update us through epic.    Orders:  -     ECG 12 Lead  -     Adult Transthoracic Echo Complete W/ Cont if Necessary Per Protocol; Future                 Obtain echocardiogram today for palpitations  Will defer monitor for now as palpitations appear to have decreased in frequency and have been minimal as of lately.  If they return, progress, worsen or she becomes symptomatic she will contact us via [facility] and we will place 2-week monitor  I will contact patient with results of echocardiogram when available.  Return in about 6 months (around 6/26/2024), or if symptoms worsen or fail to improve.      Anna Rey, ENRIQUE  12/26/23  11:18 EST

## 2023-12-26 ENCOUNTER — OFFICE VISIT (OUTPATIENT)
Dept: CARDIOLOGY | Facility: CLINIC | Age: 18
End: 2023-12-26
Payer: COMMERCIAL

## 2023-12-26 VITALS
OXYGEN SATURATION: 98 % | WEIGHT: 116.2 LBS | DIASTOLIC BLOOD PRESSURE: 58 MMHG | HEIGHT: 62 IN | BODY MASS INDEX: 21.38 KG/M2 | SYSTOLIC BLOOD PRESSURE: 106 MMHG | HEART RATE: 77 BPM

## 2023-12-26 DIAGNOSIS — R00.2 PALPITATIONS: Primary | ICD-10-CM

## 2023-12-26 NOTE — ASSESSMENT & PLAN NOTE
Obtain echocardiogram  Defer monitor for now but if palpitations return, progress, worsen or patient becomes symptomatic we will place 2-week monitor.  She will update us through epic.

## 2024-01-03 ENCOUNTER — PATIENT MESSAGE (OUTPATIENT)
Dept: INTERNAL MEDICINE | Facility: CLINIC | Age: 19
End: 2024-01-03
Payer: COMMERCIAL

## 2024-01-03 RX ORDER — OMEPRAZOLE 20 MG/1
20 CAPSULE, DELAYED RELEASE ORAL DAILY
Qty: 30 CAPSULE | Refills: 0 | Status: SHIPPED | OUTPATIENT
Start: 2024-01-03

## 2024-01-03 RX ORDER — ESCITALOPRAM OXALATE 20 MG/1
20 TABLET ORAL DAILY
Qty: 30 TABLET | Refills: 0 | Status: SHIPPED | OUTPATIENT
Start: 2024-01-03 | End: 2024-01-04 | Stop reason: SDUPTHER

## 2024-01-04 RX ORDER — ESCITALOPRAM OXALATE 20 MG/1
20 TABLET ORAL DAILY
Qty: 90 TABLET | Refills: 0 | Status: SHIPPED | OUTPATIENT
Start: 2024-01-04

## 2024-01-04 NOTE — TELEPHONE ENCOUNTER
From: Antonella Abernathy  To: Kayleen Moore  Sent: 1/3/2024 5:12 PM EST  Subject: Lexapro prescription     According to my insurance, my lexapro cannot be filled at a pharmacy, it has to be mail order so I’m not sure where it needs to be sent

## 2024-01-05 DIAGNOSIS — M22.2X1 PATELLOFEMORAL DISORDER OF RIGHT KNEE: ICD-10-CM

## 2024-01-05 DIAGNOSIS — M25.561 ARTHRALGIA OF RIGHT KNEE: ICD-10-CM

## 2024-01-05 DIAGNOSIS — R29.898 SENSATION OF KNEE INSTABILITY: Primary | ICD-10-CM

## 2024-01-05 DIAGNOSIS — M25.561 ACUTE PAIN OF RIGHT KNEE: ICD-10-CM

## 2024-01-11 RX ORDER — ESCITALOPRAM OXALATE 20 MG/1
20 TABLET ORAL DAILY
Qty: 90 TABLET | Refills: 0 | Status: SHIPPED | OUTPATIENT
Start: 2024-01-11

## 2024-01-26 ENCOUNTER — DOCUMENTATION (OUTPATIENT)
Dept: CARDIOLOGY | Facility: CLINIC | Age: 19
End: 2024-01-26
Payer: COMMERCIAL

## 2024-01-26 NOTE — PROGRESS NOTES
I contacted [facility] because her insurance will not approve her echocardiogram even through peer review.  She reports she is doing ok and still having palpitations but they are rare. She will contact us if they worsen or she develops heart failure symptoms then will get a monitor and send her to Dr Martin office for an echocardiogram at that time.      Anna NUNEZ

## 2024-01-30 DIAGNOSIS — S09.92XA BLUNT TRAUMA OF NOSE, INITIAL ENCOUNTER: Primary | ICD-10-CM

## 2024-01-31 RX ORDER — OMEPRAZOLE 20 MG/1
20 CAPSULE, DELAYED RELEASE ORAL DAILY
Qty: 90 CAPSULE | Refills: 0 | Status: SHIPPED | OUTPATIENT
Start: 2024-01-31

## 2024-02-02 RX ORDER — ESCITALOPRAM OXALATE 20 MG/1
20 TABLET ORAL DAILY
Qty: 90 TABLET | Refills: 1 | Status: SHIPPED | OUTPATIENT
Start: 2024-02-02

## 2024-02-08 ENCOUNTER — OFFICE VISIT (OUTPATIENT)
Dept: GASTROENTEROLOGY | Facility: CLINIC | Age: 19
End: 2024-02-08
Payer: COMMERCIAL

## 2024-02-08 VITALS
HEART RATE: 71 BPM | WEIGHT: 119 LBS | BODY MASS INDEX: 21.9 KG/M2 | OXYGEN SATURATION: 99 % | RESPIRATION RATE: 14 BRPM | DIASTOLIC BLOOD PRESSURE: 64 MMHG | HEIGHT: 62 IN | SYSTOLIC BLOOD PRESSURE: 90 MMHG

## 2024-02-08 DIAGNOSIS — R10.13 EPIGASTRIC PAIN: Primary | Chronic | ICD-10-CM

## 2024-02-08 DIAGNOSIS — R19.7 DIARRHEA, UNSPECIFIED TYPE: ICD-10-CM

## 2024-02-08 DIAGNOSIS — K58.0 IRRITABLE BOWEL SYNDROME WITH DIARRHEA: ICD-10-CM

## 2024-02-08 DIAGNOSIS — R11.0 NAUSEA: Chronic | ICD-10-CM

## 2024-02-08 PROCEDURE — 99214 OFFICE O/P EST MOD 30 MIN: CPT | Performed by: NURSE PRACTITIONER

## 2024-02-08 RX ORDER — DICYCLOMINE HCL 20 MG
20 TABLET ORAL 3 TIMES DAILY PRN
Qty: 30 TABLET | Refills: 1 | Status: SHIPPED | OUTPATIENT
Start: 2024-02-08

## 2024-02-08 RX ORDER — SODIUM CHLORIDE 9 MG/ML
70 INJECTION, SOLUTION INTRAVENOUS CONTINUOUS PRN
OUTPATIENT
Start: 2024-02-08

## 2024-02-08 RX ORDER — ONDANSETRON 4 MG/1
4 TABLET, FILM COATED ORAL EVERY 8 HOURS PRN
Qty: 30 TABLET | Refills: 1 | Status: SHIPPED | OUTPATIENT
Start: 2024-02-08

## 2024-02-08 NOTE — PROGRESS NOTES
Follow Up Note     Date: 2024   Patient Name: Antonella Carroll  MRN: [id number]  : 2005     Primary Care Provider: Kayleen Moore APRN     Chief Complaint   Patient presents with    Abdominal Pain     History of present illness:   2024  Antonella Carroll is a 18 y.o. female who is here today for follow up for abdominal pain. She has been doing better but has had a few episodes since her last visit where she had severe epigastric pain, nausea with vomiting and 1-2 episodes of diarrhea that may last 8-10 hours then resolves. She denies diarrhea at any other time, she typically has 1 soft bowel movement per day. She denies any GI bleeding. The episodes occurred after she had Thanksgiving dinner and after she had eaten jay or steak. Stress will cause the symptoms as well. Her PCP stopped her Pantoprazole 40 mg and changed her to Omeprazole 20 mg. She was taking it daily and stopped it 2 weeks ago. She has been not had any symptoms since she stopped the Omeprazole. Denies any history of weight loss. No family history of IBD.     Interval History:  2023  She has a history of epigastric pain that started 4 months ago that has now improved. She was started on Pantoprazole 40 mg once a day in May and her symptoms are much better.  She is now having the pain about once a month that may last 1 day or so. Eating certain foods, such as spicy foods, made symptoms worse. She had nausea associated with the pain, no vomiting. Denies reflux. No history of reflux. Denies any GI bleeding. She states she had a negative H. Pylori breath test at her PCP but we do not have those results. Denies any NSAID use.      Denies constipation, diarrhea or rectal bleeding. No family history of GI malignancy.     Subjective      Past Medical History:   Diagnosis Date    Arrhythmia     I feel like i have heart palps/flutters    Asthma     REPORTS NO USE OF INHALERS. GRANDMOTHER REPORTS NO S/S SINCE 3 YEARS OF  AGE.    GERD (gastroesophageal reflux disease)     Heart murmur     I had one when i was younger.    Hypoglycemia     Meningitis 2015    Peptic ulceration May 2023    Sickle cell trait     Spinal headache     AFTER SPINAL TAP IN 2015 (HX MENINGITIS)    Sprain     left ankle     Past Surgical History:   Procedure Laterality Date    BURSECTOMY Right 04/27/2017    Procedure: OPEN PREPATELLAR BURSECTOMY AND REPAIR RIGHT;  Surgeon: Jevon Braga MD;  Location: [facility];  Service:     EAR TUBES      OTHER SURGICAL HISTORY  2015    SPINAL TAP    TYMPANOPLASTY Right     X2     Family History   Problem Relation Age of Onset    Anxiety disorder Mother     Asthma Mother     Birth defects Brother         2 heart valves instead of 3    Arthritis Maternal Grandmother     Scoliosis Maternal Grandfather     Osteoarthritis Other     Osteoporosis Other     Asthma Other     COPD Other     Abnormal EKG Other     Colon cancer Neg Hx      Social History     Socioeconomic History    Marital status: Single   Tobacco Use    Smoking status: Never     Passive exposure: Never    Smokeless tobacco: Never   Vaping Use    Vaping Use: Never used   Substance and Sexual Activity    Alcohol use: Yes     Alcohol/week: 2.0 standard drinks of alcohol     Types: 2 Drinks containing 0.5 oz of alcohol per week    Drug use: No    Sexual activity: Yes     Partners: Male     Birth control/protection: Condom, Birth control pill       Current Outpatient Medications:     escitalopram (LEXAPRO) 20 MG tablet, Take 1 tablet by mouth Daily., Disp: 90 tablet, Rfl: 1    norethindrone (MICRONOR) 0.35 MG tablet, Take 1 tablet by mouth Daily., Disp: 28 tablet, Rfl: 12    omeprazole (priLOSEC) 20 MG capsule, TAKE 1 CAPSULE BY MOUTH DAILY, Disp: 90 capsule, Rfl: 0    dicyclomine (BENTYL) 20 MG tablet, Take 1 tablet by mouth 3 (Three) Times a Day As Needed for Abdominal Cramping., Disp: 30 tablet, Rfl: 1    ondansetron (ZOFRAN) 4 MG tablet, Take 1 tablet by mouth  "Every 8 (Eight) Hours As Needed for Nausea or Vomiting., Disp: 30 tablet, Rfl: 1    No Known Allergies    The following portions of the patient's history were reviewed and updated as appropriate: allergies, current medications, past family history, past medical history, past social history, past surgical history and problem list.  Objective     Physical Exam  Vitals and nursing note reviewed.   Constitutional:       General: She is not in acute distress.     Appearance: Normal appearance. She is well-developed.   HENT:      Head: Normocephalic and atraumatic.      Mouth/Throat:      Mouth: Mucous membranes are not pale, not dry and not cyanotic.   Eyes:      General: Lids are normal.   Neck:      Trachea: Trachea normal.   Cardiovascular:      Rate and Rhythm: Normal rate.   Pulmonary:      Effort: Pulmonary effort is normal. No respiratory distress.      Breath sounds: Normal breath sounds.   Abdominal:      Tenderness: There is no abdominal tenderness.   Skin:     General: Skin is warm and dry.   Neurological:      Mental Status: She is alert and oriented to person, place, and time.   Psychiatric:         Mood and Affect: Mood normal.         Speech: Speech normal.         Behavior: Behavior normal. Behavior is cooperative.       Vitals:    02/08/24 1117   BP: 90/64   Pulse: 71   Resp: 14   SpO2: 99%   Weight: 54 kg (119 lb)   Height: 157.5 cm (62\")     Body mass index is 21.77 kg/m².     Results Review:   I reviewed the patient's new clinical results.    No visits with results within 90 Day(s) from this visit.   Latest known visit with results is:   Results Encounter on 10/31/2023   Component Date Value Ref Range Status    WBC 11/18/2023 6.94  3.40 - 10.80 10*3/mm3 Final    RBC 11/18/2023 4.46  3.77 - 5.28 10*6/mm3 Final    Hemoglobin 11/18/2023 13.0  12.0 - 15.9 g/dL Final    Hematocrit 11/18/2023 38.6  34.0 - 46.6 % Final    MCV 11/18/2023 86.5  79.0 - 97.0 fL Final    MCH 11/18/2023 29.1  26.6 - 33.0 pg Final "    MCHC 11/18/2023 33.7  31.5 - 35.7 g/dL Final    RDW 11/18/2023 11.8 (L)  12.3 - 15.4 % Final    RDW-SD 11/18/2023 37.7  37.0 - 54.0 fl Final    MPV 11/18/2023 10.3  6.0 - 12.0 fL Final    Platelets 11/18/2023 269  140 - 450 10*3/mm3 Final    Glucose 11/18/2023 89  65 - 99 mg/dL Final    BUN 11/18/2023 9  6 - 20 mg/dL Final    Creatinine 11/18/2023 0.93  0.57 - 1.00 mg/dL Final    Sodium 11/18/2023 139  136 - 145 mmol/L Final    Potassium 11/18/2023 4.2  3.5 - 5.2 mmol/L Final    Chloride 11/18/2023 103  98 - 107 mmol/L Final    CO2 11/18/2023 25.0  22.0 - 29.0 mmol/L Final    Calcium 11/18/2023 9.5  8.6 - 10.5 mg/dL Final    Total Protein 11/18/2023 6.5  6.0 - 8.5 g/dL Final    Albumin 11/18/2023 4.5  3.5 - 5.2 g/dL Final    ALT (SGPT) 11/18/2023 12  1 - 33 U/L Final    AST (SGOT) 11/18/2023 21  1 - 32 U/L Final    Alkaline Phosphatase 11/18/2023 48  39 - 117 U/L Final    Total Bilirubin 11/18/2023 0.4  0.0 - 1.2 mg/dL Final    Globulin 11/18/2023 2.0  gm/dL Final    A/G Ratio 11/18/2023 2.3  g/dL Final    BUN/Creatinine Ratio 11/18/2023 9.7  7.0 - 25.0 Final    Anion Gap 11/18/2023 11.0  5.0 - 15.0 mmol/L Final    eGFR 11/18/2023 90.4  >60.0 mL/min/1.73 Final    Total Cholesterol 11/18/2023 127  0 - 200 mg/dL Final    Triglycerides 11/18/2023 67  0 - 150 mg/dL Final    HDL Cholesterol 11/18/2023 59  40 - 60 mg/dL Final    LDL Cholesterol  11/18/2023 54  0 - 100 mg/dL Final    VLDL Cholesterol 11/18/2023 14  5 - 40 mg/dL Final    LDL/HDL Ratio 11/18/2023 0.93   Final    TSH 11/18/2023 2.130  0.270 - 4.200 uIU/mL Final    Hepatitis C Ab 11/18/2023 Non-Reactive  Non-Reactive Final    HIV Screen 4th Gen w/RFX (Referenc* 11/18/2023 Non Reactive  Non Reactive Final    HIV Negative  HIV-1/HIV-2 antibodies and HIV-1 p24 antigen were NOT detected.  There is no laboratory evidence of HIV infection.      SCANNED - LABS (04/24/2023)     US Gallbladder 8/2/2023  Normal right upper quadrant ultrasound.     Assessment / Plan       1. Epigastric pain  2. Nausea  3. Diarrhea, unspecified type  4. Irritable bowel syndrome with diarrhea-Suspected  Symptoms suggestive of IBS. Large meals, certain foods and stress cause her symptoms, which last about 8-10 hours on average. She is not having any symptoms at this time. She denies loose stools other than at the time of the episodes of pain and nausea, she may have 1-2 loose stools. Denies any history of GI bleeding. No family history of IBD. No history of weight loss. Basic labs unremarkable.  TSH normal.  Lipase normal.  H. pylori breath test negative.  Abdominal ultrasound on 8/2/2023 unremarkable.  Low FODMAP diet-avoid all dairy.  Zofran and Bentyl as needed.  May take omeprazole 20 mg daily as needed.  Will schedule EGD for further evaluation.    May need colonoscopy in the future.    - Case Request  - ondansetron (ZOFRAN) 4 MG tablet; Take 1 tablet by mouth Every 8 (Eight) Hours As Needed for Nausea or Vomiting.  Dispense: 30 tablet; Refill: 1  - dicyclomine (BENTYL) 20 MG tablet; Take 1 tablet by mouth 3 (Three) Times a Day As Needed for Abdominal Cramping.  Dispense: 30 tablet; Refill: 1    Patient Instructions   Antireflux measures: Avoid fried, fatty foods, alcohol, chocolate, coffee, tea,  soft drinks, all carbonated beverages (including sparkling water), peppermint and spearmint, spicy foods, tomatoes and tomato based foods, onions, peppers, and garlic.   Other antireflux measures include weight reduction if overweight, avoiding tight clothing around the abdomen, elevating the head of the bed 6 inches with blocks under the head board, and don't drink or eat before going to bed and avoid lying down immediately after meals.  Omeprazole 20 mg 1 by mouth daily as needed.   Zofran 4 mg 1 po every 8 hours as needed for nausea.   Bentyl 20 mg 1 po 3 times a day as needed for abdominal cramping.   May take Imodium over the counter as needed for diarrhea.  High fiber, low fat diet with liberal  water intake.   Low FODMAP diet - avoid all dairy. May use lactose free/dairy free alternatives such as almond milk, rice milk, oat milk, etc.   Upper endoscopy-EGD: The indications, technique, alternatives and potential risk and complications were discussed with the patient including but not limited to bleeding, perforations, missing lesions and anesthetic complications. The patient understands and wishes to proceed with the procedure and has given their verbal consent. Written patient education information was given to the patient.   The patient will call if they have further questions before procedure.       Low-FODMAP Eating Plan    FODMAP stands for fermentable oligosaccharides, disaccharides, monosaccharides, and polyols. These are sugars that are hard for some people to digest. A low-FODMAP eating plan may help some people who have irritable bowel syndrome (IBS) and certain other bowel (intestinal) diseases to manage their symptoms.  This meal plan can be complicated to follow. Work with a diet and nutrition specialist (dietitian) to make a low-FODMAP eating plan that is right for you. A dietitian can help make sure that you get enough nutrition from this diet.  What are tips for following this plan?  Reading food labels  Check labels for hidden FODMAPs such as:  High-fructose syrup.  Honey.  Agave.  Natural fruit flavors.  Onion or garlic powder.  Choose low-FODMAP foods that contain 3-4 grams of fiber per serving.  Check food labels for serving sizes. Eat only one serving at a time to make sure FODMAP levels stay low.  Shopping  Shop with a list of foods that are recommended on this diet and make a meal plan.  Meal planning  Follow a low-FODMAP eating plan for up to 6 weeks, or as told by your health care provider or dietitian.  To follow the eating plan:  Eliminate high-FODMAP foods from your diet completely. Choose only low-FODMAP foods to eat. You will do this for 2-6 weeks.  Gradually reintroduce  high-FODMAP foods into your diet one at a time. Most people should wait a few days before introducing the next new high-FODMAP food into their meal plan. Your dietitian can recommend how quickly you may reintroduce foods.  Keep a daily record of what and how much you eat and drink. Make note of any symptoms that you have after eating.  Review your daily record with a dietitian regularly to identify which foods you can eat and which foods you should avoid.  General tips  Drink enough fluid each day to keep your urine pale yellow.  Avoid processed foods. These often have added sugar and may be high in FODMAPs.  Avoid most dairy products, whole grains, and sweeteners.  Work with a dietitian to make sure you get enough fiber in your diet.  Avoid high FODMAP foods at meals to manage symptoms.    Recommended foods  Fruits  Bananas, oranges, tangerines, [place], limes, blueberries, raspberries, strawberries, grapes, cantaloupe, honeydew melon, kiwi, papaya, passion fruit, and pineapple. Limited amounts of dried cranberries, banana chips, and shredded coconut.  Vegetables  Eggplant, zucchini, cucumber, peppers, green beans, bean sprouts, lettuce, arugula, kale, Swiss chard, spinach, juan luis greens, bok charis, summer squash, potato, and tomato. Limited amounts of corn, carrot, and sweet potato. Green parts of scallions.  Grains  Gluten-free grains, such as rice, oats, buckwheat, quinoa, corn, polenta, and millet. Gluten-free pasta, bread, or cereal. Rice noodles. Corn tortillas.  Meats and other proteins  Unseasoned beef, pork, poultry, or fish. Eggs. [place]. Tofu (firm) and tempeh. Limited amounts of nuts and seeds, such as almonds, walnuts, brazil nuts, pecans, peanuts, nut butters, pumpkin seeds, brian seeds, and sunflower seeds.  Dairy  Lactose-free milk, yogurt, and kefir. Lactose-free cottage cheese and ice cream. Non-dairy milks, such as almond, coconut, hemp, and rice milk. Non-dairy yogurt. Limited amounts of goat  "cheese, brie, mozzarella, parmesan, swiss, and other hard cheeses.  Fats and oils  Butter-free spreads. Vegetable oils, such as olive, canola, and sunflower oil.  Seasoning and other foods  Artificial sweeteners with names that do not end in \"ol,\" such as aspartame, saccharine, and stevia. Maple syrup, white table sugar, raw sugar, brown sugar, and molasses. Mayonnaise, soy sauce, and tamari. Fresh basil, coriander, parsley, rosemary, and thyme.  Beverages  Water and mineral water. Sugar-sweetened soft drinks. Small amounts of orange juice or cranberry juice. Black and green tea. Most dry liza. Coffee.  The items listed above may not be a complete list of foods and beverages you can eat. Contact a dietitian for more information.    Foods to avoid  Fruits  Fresh, dried, and juiced forms of apple, pear, watermelon, peach, plum, cherries, apricots, blackberries, boysenberries, figs, nectarines, and yeimi. Avocado.  Vegetables  Chicory root, artichoke, asparagus, cabbage, snow peas, [place] sprouts, broccoli, sugar snap peas, mushrooms, celery, and cauliflower. Onions, garlic, leeks, and the white part of scallions.  Grains  Wheat, including kamut, durum, and semolina. Barley and bulgur. Couscous. Wheat-based cereals. Wheat noodles, bread, crackers, and pastries.  Meats and other proteins  Fried or fatty meat. Sausage. Cashews and pistachios. Soybeans, baked beans, black beans, chickpeas, kidney beans, jocy beans, navy beans, lentils, black-eyed peas, and split peas.  Dairy  Milk, yogurt, ice cream, and soft cheese. Cream and sour cream. Milk-based sauces. Custard. Buttermilk. Soy milk.  Seasoning and other foods  Any sugar-free gum or candy. Foods that contain artificial sweeteners such as sorbitol, mannitol, isomalt, or xylitol. Foods that contain honey, high-fructose corn syrup, or agave. Bouillon, vegetable stock, beef stock, and chicken stock. Garlic and onion powder. Condiments made with onion, such as hummus, " chutney, pickles, relish, salad dressing, and salsa. Tomato paste.  Beverages  Chicory-based drinks. Coffee substitutes. Chamomile tea. Fennel tea. Sweet or fortified liza such as port or ronnie. Diet soft drinks made with isomalt, mannitol, maltitol, sorbitol, or xylitol. Apple, pear, and yeimi juice. Juices with high-fructose corn syrup.  The items listed above may not be a complete list of foods and beverages you should avoid. Contact a dietitian for more information.    Summary  FODMAP stands for fermentable oligosaccharides, disaccharides, monosaccharides, and polyols. These are sugars that are hard for some people to digest.  A low-FODMAP eating plan is a short-term diet that helps to ease symptoms of certain bowel diseases.  The eating plan usually lasts up to 6 weeks. After that, high-FODMAP foods are reintroduced gradually and one at a time. This can help you find out which foods may be causing symptoms.  A low-FODMAP eating plan can be complicated. It is best to work with a dietitian who has experience with this type of plan.  This information is not intended to replace advice given to you by your health care provider. Make sure you discuss any questions you have with your health care provider.  Document Revised: 05/06/2021 Document Reviewed: 05/06/2021  [employer] Patient Education © 2023 [employer]    Madelyn Osullivan, APRN  2/8/2024    Please note that portions of this note were completed with a voice recognition program.

## 2024-02-08 NOTE — PATIENT INSTRUCTIONS
Antireflux measures: Avoid fried, fatty foods, alcohol, chocolate, coffee, tea,  soft drinks, all carbonated beverages (including sparkling water), peppermint and spearmint, spicy foods, tomatoes and tomato based foods, onions, peppers, and garlic.   Other antireflux measures include weight reduction if overweight, avoiding tight clothing around the abdomen, elevating the head of the bed 6 inches with blocks under the head board, and don't drink or eat before going to bed and avoid lying down immediately after meals.  Omeprazole 20 mg 1 by mouth daily as needed.   Zofran 4 mg 1 po every 8 hours as needed for nausea.   Bentyl 20 mg 1 po 3 times a day as needed for abdominal cramping.   May take Imodium over the counter as needed for diarrhea.  High fiber, low fat diet with liberal water intake.   Low FODMAP diet - avoid all dairy. May use lactose free/dairy free alternatives such as almond milk, rice milk, oat milk, etc.   Upper endoscopy-EGD: The indications, technique, alternatives and potential risk and complications were discussed with the patient including but not limited to bleeding, perforations, missing lesions and anesthetic complications. The patient understands and wishes to proceed with the procedure and has given their verbal consent. Written patient education information was given to the patient.   The patient will call if they have further questions before procedure.       Low-FODMAP Eating Plan    FODMAP stands for fermentable oligosaccharides, disaccharides, monosaccharides, and polyols. These are sugars that are hard for some people to digest. A low-FODMAP eating plan may help some people who have irritable bowel syndrome (IBS) and certain other bowel (intestinal) diseases to manage their symptoms.  This meal plan can be complicated to follow. Work with a diet and nutrition specialist (dietitian) to make a low-FODMAP eating plan that is right for you. A dietitian can help make sure that you get  enough nutrition from this diet.  What are tips for following this plan?  Reading food labels  Check labels for hidden FODMAPs such as:  High-fructose syrup.  Honey.  Agave.  Natural fruit flavors.  Onion or garlic powder.  Choose low-FODMAP foods that contain 3-4 grams of fiber per serving.  Check food labels for serving sizes. Eat only one serving at a time to make sure FODMAP levels stay low.  Shopping  Shop with a list of foods that are recommended on this diet and make a meal plan.  Meal planning  Follow a low-FODMAP eating plan for up to 6 weeks, or as told by your health care provider or dietitian.  To follow the eating plan:  Eliminate high-FODMAP foods from your diet completely. Choose only low-FODMAP foods to eat. You will do this for 2-6 weeks.  Gradually reintroduce high-FODMAP foods into your diet one at a time. Most people should wait a few days before introducing the next new high-FODMAP food into their meal plan. Your dietitian can recommend how quickly you may reintroduce foods.  Keep a daily record of what and how much you eat and drink. Make note of any symptoms that you have after eating.  Review your daily record with a dietitian regularly to identify which foods you can eat and which foods you should avoid.  General tips  Drink enough fluid each day to keep your urine pale yellow.  Avoid processed foods. These often have added sugar and may be high in FODMAPs.  Avoid most dairy products, whole grains, and sweeteners.  Work with a dietitian to make sure you get enough fiber in your diet.  Avoid high FODMAP foods at meals to manage symptoms.    Recommended foods  Fruits  Bananas, oranges, tangerines, [place], limes, blueberries, raspberries, strawberries, grapes, cantaloupe, honeydew melon, kiwi, papaya, passion fruit, and pineapple. Limited amounts of dried cranberries, banana chips, and shredded coconut.  Vegetables  Eggplant, zucchini, cucumber, peppers, green beans, bean sprouts, lettuce,  "arugula, kale, Swiss chard, spinach, juan luis greens, bok charis, summer squash, potato, and tomato. Limited amounts of corn, carrot, and sweet potato. Green parts of scallions.  Grains  Gluten-free grains, such as rice, oats, buckwheat, quinoa, corn, polenta, and millet. Gluten-free pasta, bread, or cereal. Rice noodles. Corn tortillas.  Meats and other proteins  Unseasoned beef, pork, poultry, or fish. Eggs. [place]. Tofu (firm) and tempeh. Limited amounts of nuts and seeds, such as almonds, walnuts, brazil nuts, pecans, peanuts, nut butters, pumpkin seeds, brian seeds, and sunflower seeds.  Dairy  Lactose-free milk, yogurt, and kefir. Lactose-free cottage cheese and ice cream. Non-dairy milks, such as almond, coconut, hemp, and rice milk. Non-dairy yogurt. Limited amounts of goat cheese, brie, mozzarella, parmesan, swiss, and other hard cheeses.  Fats and oils  Butter-free spreads. Vegetable oils, such as olive, canola, and sunflower oil.  Seasoning and other foods  Artificial sweeteners with names that do not end in \"ol,\" such as aspartame, saccharine, and stevia. Maple syrup, white table sugar, raw sugar, brown sugar, and molasses. Mayonnaise, soy sauce, and tamari. Fresh basil, coriander, parsley, rosemary, and thyme.  Beverages  Water and mineral water. Sugar-sweetened soft drinks. Small amounts of orange juice or cranberry juice. Black and green tea. Most dry liza. Coffee.  The items listed above may not be a complete list of foods and beverages you can eat. Contact a dietitian for more information.    Foods to avoid  Fruits  Fresh, dried, and juiced forms of apple, pear, watermelon, peach, plum, cherries, apricots, blackberries, boysenberries, figs, nectarines, and yeimi. Avocado.  Vegetables  Chicory root, artichoke, asparagus, cabbage, snow peas, [place] sprouts, broccoli, sugar snap peas, mushrooms, celery, and cauliflower. Onions, garlic, leeks, and the white part of scallions.  Grains  Wheat, including " kamut, durum, and semolina. Barley and bulgur. Couscous. Wheat-based cereals. Wheat noodles, bread, crackers, and pastries.  Meats and other proteins  Fried or fatty meat. Sausage. Cashews and pistachios. Soybeans, baked beans, black beans, chickpeas, kidney beans, jocy beans, navy beans, lentils, black-eyed peas, and split peas.  Dairy  Milk, yogurt, ice cream, and soft cheese. Cream and sour cream. Milk-based sauces. Custard. Buttermilk. Soy milk.  Seasoning and other foods  Any sugar-free gum or candy. Foods that contain artificial sweeteners such as sorbitol, mannitol, isomalt, or xylitol. Foods that contain honey, high-fructose corn syrup, or agave. Bouillon, vegetable stock, beef stock, and chicken stock. Garlic and onion powder. Condiments made with onion, such as hummus, chutney, pickles, relish, salad dressing, and salsa. Tomato paste.  Beverages  Chicory-based drinks. Coffee substitutes. Chamomile tea. Fennel tea. Sweet or fortified liza such as port or ronnie. Diet soft drinks made with isomalt, mannitol, maltitol, sorbitol, or xylitol. Apple, pear, and yeimi juice. Juices with high-fructose corn syrup.  The items listed above may not be a complete list of foods and beverages you should avoid. Contact a dietitian for more information.    Summary  FODMAP stands for fermentable oligosaccharides, disaccharides, monosaccharides, and polyols. These are sugars that are hard for some people to digest.  A low-FODMAP eating plan is a short-term diet that helps to ease symptoms of certain bowel diseases.  The eating plan usually lasts up to 6 weeks. After that, high-FODMAP foods are reintroduced gradually and one at a time. This can help you find out which foods may be causing symptoms.  A low-FODMAP eating plan can be complicated. It is best to work with a dietitian who has experience with this type of plan.  This information is not intended to replace advice given to you by your health care provider. Make sure  you discuss any questions you have with your health care provider.  Document Revised: 05/06/2021 Document Reviewed: 05/06/2021  Elsevier Patient Education © 2023 [employer]

## 2024-02-26 ENCOUNTER — OFFICE VISIT (OUTPATIENT)
Dept: INTERNAL MEDICINE | Facility: CLINIC | Age: 19
End: 2024-02-26
Payer: COMMERCIAL

## 2024-02-26 VITALS
WEIGHT: 121 LBS | HEIGHT: 62 IN | TEMPERATURE: 97.5 F | BODY MASS INDEX: 22.26 KG/M2 | DIASTOLIC BLOOD PRESSURE: 64 MMHG | HEART RATE: 84 BPM | OXYGEN SATURATION: 99 % | SYSTOLIC BLOOD PRESSURE: 98 MMHG

## 2024-02-26 DIAGNOSIS — T78.1XXA DELAYED ALLERGIC REACTION TO RED MEAT: ICD-10-CM

## 2024-02-26 DIAGNOSIS — R53.83 FATIGUE, UNSPECIFIED TYPE: Primary | ICD-10-CM

## 2024-02-26 DIAGNOSIS — E55.9 VITAMIN D DEFICIENCY: ICD-10-CM

## 2024-02-26 PROCEDURE — 99213 OFFICE O/P EST LOW 20 MIN: CPT | Performed by: NURSE PRACTITIONER

## 2024-02-26 NOTE — PROGRESS NOTES
Office Visit      Date: 2024   Patient Name: Antonella Carroll  : 2005   MRN: [id number]     Chief Complaint:    Chief Complaint   Patient presents with    Fatigue     X few months     brain fog     X couple weeks       History of Present Illness: Antonella Carroll is a 18 y.o. female presents for fatigue and brain fog.  Fatigue has been going on for the past few months.  Brain fog has been going on for the past couple weeks.  She gets anywhere between 4 and 8 hours of sleep at night.  A few weeks ago she was talking to her boyfriend in her sleep, she is to do that as a child.  No habitual snoring or apneic episodes that she is aware of or that her boyfriend has heard.  She has had a lot of GI episodes.  We did a ultrasound of her gallbladder which was normal.  She saw gastroenterology which scheduled EGD on Friday and put her on Zofran and Bentyl as needed.  Her symptoms have been stable so she has not needed to take these medications lately.  She does admit when she eats red meat that the symptoms are worse.  She does not remember any tick bites.  She has avoided eating red meat since November.  She is able to eat greasy meals such as canes chicken without any issue.  Thyroid panel previously normal.  She goes to bed between 10 and 11 PM.  She does not watch TV, drink anything, stay on her phone 1 hour before bed.    Subjective      Review of Systems:   Pertinent ROS noted in HPI.     I have reviewed the patients family history, social history, past medical history, past surgical history and have updated it as appropriate.     Medications:     Current Outpatient Medications:     dicyclomine (BENTYL) 20 MG tablet, Take 1 tablet by mouth 3 (Three) Times a Day As Needed for Abdominal Cramping., Disp: 30 tablet, Rfl: 1    escitalopram (LEXAPRO) 20 MG tablet, Take 1 tablet by mouth Daily., Disp: 90 tablet, Rfl: 1    norethindrone (MICRONOR) 0.35 MG tablet, Take 1 tablet by mouth Daily., Disp: 28  "tablet, Rfl: 12    omeprazole (priLOSEC) 20 MG capsule, TAKE 1 CAPSULE BY MOUTH DAILY, Disp: 90 capsule, Rfl: 0    ondansetron (ZOFRAN) 4 MG tablet, Take 1 tablet by mouth Every 8 (Eight) Hours As Needed for Nausea or Vomiting., Disp: 30 tablet, Rfl: 1    Allergies:   No Known Allergies    Objective     Physical Exam  Physical Exam  Vitals and nursing note reviewed.   Constitutional:       General: She is not in acute distress.     Appearance: Normal appearance.   Eyes:      Extraocular Movements: Extraocular movements intact.   Cardiovascular:      Rate and Rhythm: Normal rate and regular rhythm.   Pulmonary:      Effort: Pulmonary effort is normal.      Breath sounds: Normal breath sounds.   Musculoskeletal:         General: Normal range of motion.      Cervical back: Normal range of motion.   Skin:     Coloration: Skin is pale.   Neurological:      Mental Status: She is alert and oriented to person, place, and time.   Psychiatric:         Mood and Affect: Mood normal.         Vital Signs:   Vitals:    02/26/24 1317   BP: 98/64   Pulse: 84   Temp: 97.5 °F (36.4 °C)   SpO2: 99%   Weight: 54.9 kg (121 lb)   Height: 157.5 cm (62\")     Body mass index is 22.13 kg/m².  BMI is within normal parameters. No other follow-up for BMI required.    Labs:   No visits with results within 1 Month(s) from this visit.   Latest known visit with results is:   Results Encounter on 10/31/2023   Component Date Value Ref Range Status    WBC 11/18/2023 6.94  3.40 - 10.80 10*3/mm3 Final    RBC 11/18/2023 4.46  3.77 - 5.28 10*6/mm3 Final    Hemoglobin 11/18/2023 13.0  12.0 - 15.9 g/dL Final    Hematocrit 11/18/2023 38.6  34.0 - 46.6 % Final    MCV 11/18/2023 86.5  79.0 - 97.0 fL Final    MCH 11/18/2023 29.1  26.6 - 33.0 pg Final    MCHC 11/18/2023 33.7  31.5 - 35.7 g/dL Final    RDW 11/18/2023 11.8 (L)  12.3 - 15.4 % Final    RDW-SD 11/18/2023 37.7  37.0 - 54.0 fl Final    MPV 11/18/2023 10.3  6.0 - 12.0 fL Final    Platelets 11/18/2023 269 "  140 - 450 10*3/mm3 Final    Glucose 11/18/2023 89  65 - 99 mg/dL Final    BUN 11/18/2023 9  6 - 20 mg/dL Final    Creatinine 11/18/2023 0.93  0.57 - 1.00 mg/dL Final    Sodium 11/18/2023 139  136 - 145 mmol/L Final    Potassium 11/18/2023 4.2  3.5 - 5.2 mmol/L Final    Chloride 11/18/2023 103  98 - 107 mmol/L Final    CO2 11/18/2023 25.0  22.0 - 29.0 mmol/L Final    Calcium 11/18/2023 9.5  8.6 - 10.5 mg/dL Final    Total Protein 11/18/2023 6.5  6.0 - 8.5 g/dL Final    Albumin 11/18/2023 4.5  3.5 - 5.2 g/dL Final    ALT (SGPT) 11/18/2023 12  1 - 33 U/L Final    AST (SGOT) 11/18/2023 21  1 - 32 U/L Final    Alkaline Phosphatase 11/18/2023 48  39 - 117 U/L Final    Total Bilirubin 11/18/2023 0.4  0.0 - 1.2 mg/dL Final    Globulin 11/18/2023 2.0  gm/dL Final    A/G Ratio 11/18/2023 2.3  g/dL Final    BUN/Creatinine Ratio 11/18/2023 9.7  7.0 - 25.0 Final    Anion Gap 11/18/2023 11.0  5.0 - 15.0 mmol/L Final    eGFR 11/18/2023 90.4  >60.0 mL/min/1.73 Final    Total Cholesterol 11/18/2023 127  0 - 200 mg/dL Final    Triglycerides 11/18/2023 67  0 - 150 mg/dL Final    HDL Cholesterol 11/18/2023 59  40 - 60 mg/dL Final    LDL Cholesterol  11/18/2023 54  0 - 100 mg/dL Final    VLDL Cholesterol 11/18/2023 14  5 - 40 mg/dL Final    LDL/HDL Ratio 11/18/2023 0.93   Final    TSH 11/18/2023 2.130  0.270 - 4.200 uIU/mL Final    Hepatitis C Ab 11/18/2023 Non-Reactive  Non-Reactive Final    HIV Screen 4th Gen w/RFX (Referenc* 11/18/2023 Non Reactive  Non Reactive Final    HIV Negative  HIV-1/HIV-2 antibodies and HIV-1 p24 antigen were NOT detected.  There is no laboratory evidence of HIV infection.        Assessment / Plan      Assessment/Plan:   Problem List Items Addressed This Visit    None  Visit Diagnoses       Fatigue, unspecified type    -  Primary    Relevant Orders    Ferritin    Vitamin D 25 hydroxy    Vitamin B12    Folate    Vitamin D deficiency        Relevant Orders    Vitamin D 25 hydroxy    Reaction to red meat         Relevant Orders    Alpha-Gal IgE Panel            Labs to further evaluate.  Patient has not been eating red meat since November as this caused GI upset.  Discussed potential for iron deficiency anemia.  Also discussed the potential for alpha gal and allergy to red meats.  Likely multifactorial causes for fatigue.  Follow-up for EGD with gastroenterology on Friday.  Ensure adequate sleep 7-9 hours per night, exercise regularly, adequate hydration >64 fluid oz per day, stress management.  Trial of magnesium glycinate nightly to help get better rest, practice good sleep hygiene and limit caffeine.    Follow Up:   Return if symptoms worsen or fail to improve.    Kayleen NUNEZ  [facility]

## 2024-02-29 NOTE — PRE-PROCEDURE INSTRUCTIONS
PAT phone history completed with patient for upcoming procedure on Dr. Horn on 3/1/24.    PAT PASS reviewed with patient and they verbalize understanding of the following:     Do not eat or drink anything after midnight the night before procedure unless otherwise instructed by physician/surgeon's office, this includes no gum, candy, mints, tobacco products or e-cigarettes.  Do not shave the area to be operated on at least 48 hours prior to procedure.  Do not wear makeup, lotion, hair products, or nail polish.  Do not wear any jewelry and remove all piercings.  Do not wear any adhesive if you wear dentures.  Do not wear contacts; bring in glasses if needed.  Only take medications on the morning of procedure as instructed by PAT nurse per anesthesia guidelines or as instructed by physician's office.  If you are on any blood thinners reach out to the physician/surgeon's office for instructions on when/if they will need to be stopped prior to procedure.  Bring in picture ID and insurance card, advanced directive copies if applicable, CPAP/BIPAP/Inhalers if indicated morning of procedure, leave any other valuables at home.  Ensure you have arranged for someone to drive you home the day of your procedure and someone to care for you at home afterwards. It is recommended that you do not drive, drink alcohol, or make any major legal decisions for at least 24 hours after your procedure is complete.    Instructions given on hospital entrance and registration location.

## 2024-02-29 NOTE — PROGRESS NOTES
Vitamin D is low, make sure to take 2000 international units of vitamin D3 daily over-the-counter and get 15-20 minutes of sunlight daily  Iron stores are low end of normal, take a prenatal vitamin with iron daily and increase iron enriched foods in diet  Vitamin B6 12 is normal but would rather see it >400 so taking a prenatal will have some B12 vitamin and it  Folate vitamin normal   still waiting on alpha-gal panel to check for tick borne allergy

## 2024-03-01 ENCOUNTER — ANESTHESIA EVENT (OUTPATIENT)
Dept: GASTROENTEROLOGY | Facility: HOSPITAL | Age: 19
End: 2024-03-01
Payer: COMMERCIAL

## 2024-03-01 ENCOUNTER — HOSPITAL ENCOUNTER (OUTPATIENT)
Facility: HOSPITAL | Age: 19
Setting detail: HOSPITAL OUTPATIENT SURGERY
Discharge: HOME | End: 2024-03-01
Attending: INTERNAL MEDICINE | Admitting: INTERNAL MEDICINE
Payer: COMMERCIAL

## 2024-03-01 ENCOUNTER — ANESTHESIA (OUTPATIENT)
Dept: GASTROENTEROLOGY | Facility: HOSPITAL | Age: 19
End: 2024-03-01
Payer: COMMERCIAL

## 2024-03-01 VITALS
RESPIRATION RATE: 16 BRPM | OXYGEN SATURATION: 99 % | BODY MASS INDEX: 22.26 KG/M2 | TEMPERATURE: 97.5 F | SYSTOLIC BLOOD PRESSURE: 99 MMHG | HEIGHT: 62 IN | HEART RATE: 68 BPM | DIASTOLIC BLOOD PRESSURE: 66 MMHG | WEIGHT: 121 LBS

## 2024-03-01 DIAGNOSIS — R10.13 EPIGASTRIC PAIN: ICD-10-CM

## 2024-03-01 DIAGNOSIS — R11.0 NAUSEA: ICD-10-CM

## 2024-03-01 LAB
25(OH)D3+25(OH)D2 SERPL-MCNC: 25 NG/ML (ref 30–100)
ALPHA-GAL IGE QN: <0.1 KU/L
B-HCG UR QL: NEGATIVE
BEEF IGE QN: <0.1 KU/L
CONV CLASS DESCRIPTION: NORMAL
EXPIRATION DATE: NORMAL
FERRITIN SERPL-MCNC: 24.3 NG/ML (ref 13–150)
FOLATE SERPL-MCNC: 8.21 NG/ML (ref 4.78–24.2)
IGE SERPL-ACNC: 25 IU/ML (ref 6–495)
INTERNAL NEGATIVE CONTROL: NORMAL
INTERNAL POSITIVE CONTROL: NORMAL
LAMB IGE QN: <0.1 KU/L
Lab: NORMAL
PORK IGE QN: <0.1 KU/L
VIT B12 SERPL-MCNC: 366 PG/ML (ref 211–946)

## 2024-03-01 PROCEDURE — 25810000003 SODIUM CHLORIDE 0.9 % SOLUTION: Performed by: NURSE PRACTITIONER

## 2024-03-01 PROCEDURE — 25010000002 PROPOFOL 10 MG/ML EMULSION: Performed by: NURSE ANESTHETIST, CERTIFIED REGISTERED

## 2024-03-01 PROCEDURE — 43239 EGD BIOPSY SINGLE/MULTIPLE: CPT | Performed by: INTERNAL MEDICINE

## 2024-03-01 PROCEDURE — 81025 URINE PREGNANCY TEST: CPT | Performed by: INTERNAL MEDICINE

## 2024-03-01 RX ORDER — SODIUM CHLORIDE 9 MG/ML
70 INJECTION, SOLUTION INTRAVENOUS CONTINUOUS PRN
Status: DISCONTINUED | OUTPATIENT
Start: 2024-03-01 | End: 2024-03-01 | Stop reason: HOSPADM

## 2024-03-01 RX ORDER — PROPOFOL 10 MG/ML
VIAL (ML) INTRAVENOUS AS NEEDED
Status: DISCONTINUED | OUTPATIENT
Start: 2024-03-01 | End: 2024-03-01 | Stop reason: SURG

## 2024-03-01 RX ORDER — ONDANSETRON 2 MG/ML
4 INJECTION INTRAMUSCULAR; INTRAVENOUS ONCE AS NEEDED
Status: CANCELLED | OUTPATIENT
Start: 2024-03-01 | End: 2024-03-01

## 2024-03-01 RX ADMIN — SODIUM CHLORIDE 70 ML/HR: 9 INJECTION, SOLUTION INTRAVENOUS at 07:22

## 2024-03-01 RX ADMIN — PROPOFOL 200 MG: 10 INJECTION, EMULSION INTRAVENOUS at 08:11

## 2024-03-01 RX ADMIN — LIDOCAINE HYDROCHLORIDE 60 MG: 20 INJECTION, SOLUTION INTRAVENOUS at 08:11

## 2024-03-01 NOTE — ANESTHESIA POSTPROCEDURE EVALUATION
Patient: Antonella Carroll    Procedure Summary       Date: 03/01/24 Room / Location: [facility] ENDOSCOPY 2 / [facility] ENDOSCOPY    Anesthesia Start: 0806 Anesthesia Stop: 0821    Procedure: ESOPHAGOGASTRODUODENOSCOPY WITH BIOPSY (Esophagus) Diagnosis:       Epigastric pain      Nausea      (Epigastric pain [R10.13])      (Nausea [R11.0])    Surgeons: Paddy Horn MD Provider: Mervin Reyes CRNA    Anesthesia Type: MAC ASA Status: 2            Anesthesia Type: MAC    Vitals  Vitals Value Taken Time   BP 99/66 03/01/24 0845   Temp 97.5 °F (36.4 °C) 03/01/24 0822   Pulse 68 03/01/24 0845   Resp 16 03/01/24 0845   SpO2 99 % 03/01/24 0845           Post Anesthesia Care and Evaluation    Patient location during evaluation: PHASE II  Patient participation: complete - patient participated  Level of consciousness: awake  Pain score: 1  Pain management: adequate    Airway patency: patent  Anesthetic complications: No anesthetic complications  PONV Status: controlled  Cardiovascular status: acceptable and stable  Respiratory status: acceptable  Hydration status: acceptable    Comments: See Nursing record for post procedural Vital Signs as per protocol.

## 2024-03-01 NOTE — H&P
[facility]  HISTORY AND PHYSICAL    Patient Name: Antonella Carroll  : 2005  MRN: [id number]    Chief complaints; EGD    History Of Presenting Illness:    Nausea   Vomiting  Upper abdominal pain   Diarrhea     Past Medical History:   Diagnosis Date    Anxiety and depression     Arrhythmia     I feel like i have heart palps/flutters    Asthma     as a child    GERD (gastroesophageal reflux disease)     Heart murmur     I had one when i was younger.    Hypoglycemia     Meningitis     Peptic ulceration May 2023    Sickle cell trait     Sprain     left ankle       Past Surgical History:   Procedure Laterality Date    BURSECTOMY Right 2017    Procedure: OPEN PREPATELLAR BURSECTOMY AND REPAIR RIGHT;  Surgeon: Jevon Braga MD;  Location: [facility] OR;  Service:     EAR TUBES      ENDOSCOPY      at age 6    OTHER SURGICAL HISTORY      SPINAL TAP    TYMPANOPLASTY Right     X2       Social History     Socioeconomic History    Marital status: Single   Tobacco Use    Smoking status: Never     Passive exposure: Never    Smokeless tobacco: Never   Vaping Use    Vaping Use: Never used   Substance and Sexual Activity    Alcohol use: Yes     Alcohol/week: 2.0 standard drinks of alcohol     Types: 2 Drinks containing 0.5 oz of alcohol per week    Drug use: No    Sexual activity: Defer       Family History   Problem Relation Age of Onset    Anxiety disorder Mother     Asthma Mother     Birth defects Brother         2 heart valves instead of 3    Arthritis Maternal Grandmother     Scoliosis Maternal Grandfather     Osteoarthritis Other     Osteoporosis Other     Asthma Other     COPD Other     Abnormal EKG Other     Colon cancer Neg Hx        Prior to Admission Medications:  Medications Prior to Admission   Medication Sig Dispense Refill Last Dose    dicyclomine (BENTYL) 20 MG tablet Take 1 tablet by mouth 3 (Three) Times a Day As Needed for Abdominal Cramping. 30 tablet 1 Past Week     escitalopram (LEXAPRO) 20 MG tablet Take 1 tablet by mouth Daily. (Patient taking differently: Take 1 tablet by mouth Every Afternoon. 1 pm) 90 tablet 1 2/29/2024    norethindrone (MICRONOR) 0.35 MG tablet Take 1 tablet by mouth Daily. (Patient taking differently: Take 1 tablet by mouth every night at bedtime.) 28 tablet 12 2/29/2024    ondansetron (ZOFRAN) 4 MG tablet Take 1 tablet by mouth Every 8 (Eight) Hours As Needed for Nausea or Vomiting. 30 tablet 1 Past Week    omeprazole (priLOSEC) 20 MG capsule TAKE 1 CAPSULE BY MOUTH DAILY (Patient not taking: Reported on 2/29/2024) 90 capsule 0 Unknown       Allergies:  No Known Allergies     Vitals: Temp:  [97.3 °F (36.3 °C)] 97.3 °F (36.3 °C)  Heart Rate:  [77] 77  Resp:  [16] 16  BP: (98)/(65) 98/65    Review Of Systems:  Constitutional:  Negative for chills, fever, and unexpected weight change.  Respiratory:  Negative for cough, chest tightness, shortness of breath, and wheezing.  Cardiovascular:  Negative for chest pain, palpitations, and leg swelling.  Gastrointestinal:  Negative for abdominal distention, abdominal pain, nausea, vomiting.  Neurological:  Negative for weakness, numbness, and headaches.     Physical Exam:    General Appearance:  Alert, cooperative, in no acute distress.   Lungs:   Clear to auscultation, respirations regular, even and                 unlabored.   Heart:  Regular rhythm and normal rate.   Abdomen:   Normal bowel sounds, no masses, no organomegaly. Soft, nontender, nondistended   Neurologic: Alert and oriented x 3. Moves all four limbs equally       Assessment & Plan     Assessment:  Active Problems:    Epigastric pain    Nausea      Plan: ESOPHAGOGASTRODUODENOSCOPY (N/A)     Paddy Horn MD  3/1/2024

## 2024-03-01 NOTE — ANESTHESIA PREPROCEDURE EVALUATION
Anesthesia Evaluation     Patient summary reviewed and Nursing notes reviewed   history of anesthetic complications (history of spinal headache after spinal tap for menigitis):                Airway   Mallampati: II  TM distance: >3 FB  Neck ROM: full  no difficulty expected and Possible difficult intubation  Dental - normal exam     Comment: Braces upper and lower    Pulmonary - normal exam   (+) asthma (no inhaler currently),  (-) shortness of breath, sleep apnea  Cardiovascular - negative cardio ROS and normal exam        Neuro/Psych  (+) psychiatric history Anxiety and Depression  (-) seizures, neuromuscular disease, TIA, syncope, tremors, weakness  GI/Hepatic/Renal/Endo    (+) GERD (not taking medications currently) well controlled  (-) diabetes    Musculoskeletal (-) negative ROS    Abdominal    Substance History   (-) alcohol use, drug use     OB/GYN      Comment: Urine preg negative      Other        ROS/Med Hx Other: Carries Sickle Cell trait                Anesthesia Plan    ASA 2     MAC     (Pt told that intravenous sedation will be used as the primary anesthetic along with local anesthesia if necessary. Every effort will be made to make sure the patient is comfortable.     The patient was told they may or may not have recall for the procedure. It was further explained that if the MAC was not adequate that a general anesthetic with either an LMA or endotracheal tube would be required.     Will proceed with the plan of care.)  intravenous induction     Anesthetic plan, risks, benefits, and alternatives have been provided, discussed and informed consent has been obtained with: patient and mother.

## 2024-03-04 LAB — REF LAB TEST METHOD: NORMAL

## 2024-05-13 ENCOUNTER — LAB (OUTPATIENT)
Dept: LAB | Facility: HOSPITAL | Age: 19
End: 2024-05-13
Payer: COMMERCIAL

## 2024-05-13 ENCOUNTER — OFFICE VISIT (OUTPATIENT)
Dept: GASTROENTEROLOGY | Facility: CLINIC | Age: 19
End: 2024-05-13
Payer: COMMERCIAL

## 2024-05-13 VITALS
OXYGEN SATURATION: 98 % | DIASTOLIC BLOOD PRESSURE: 58 MMHG | SYSTOLIC BLOOD PRESSURE: 90 MMHG | RESPIRATION RATE: 16 BRPM | HEIGHT: 62 IN | HEART RATE: 90 BPM | BODY MASS INDEX: 23 KG/M2 | WEIGHT: 125 LBS

## 2024-05-13 DIAGNOSIS — R19.7 DIARRHEA, UNSPECIFIED TYPE: Chronic | ICD-10-CM

## 2024-05-13 DIAGNOSIS — R11.0 NAUSEA: Chronic | ICD-10-CM

## 2024-05-13 DIAGNOSIS — K30 DELAYED GASTRIC EMPTYING: Chronic | ICD-10-CM

## 2024-05-13 DIAGNOSIS — K92.9 FUNCTIONAL GASTROINTESTINAL DISORDER: Chronic | ICD-10-CM

## 2024-05-13 DIAGNOSIS — K58.0 IRRITABLE BOWEL SYNDROME WITH DIARRHEA: Chronic | ICD-10-CM

## 2024-05-13 DIAGNOSIS — R10.13 EPIGASTRIC PAIN: Primary | Chronic | ICD-10-CM

## 2024-05-13 PROCEDURE — 82784 ASSAY IGA/IGD/IGG/IGM EACH: CPT

## 2024-05-13 PROCEDURE — 86364 TISS TRNSGLTMNASE EA IG CLAS: CPT

## 2024-05-13 PROCEDURE — 99214 OFFICE O/P EST MOD 30 MIN: CPT | Performed by: NURSE PRACTITIONER

## 2024-05-13 PROCEDURE — 36415 COLL VENOUS BLD VENIPUNCTURE: CPT

## 2024-05-13 NOTE — PROGRESS NOTES
Follow Up Note     Date: 2024   Patient Name: Antonella Arellano  MRN: [id number]  : 2005     Primary Care Provider: Kayleen Moore APRN     Chief Complaint   Patient presents with    Follow-up     After EGD     History of present illness:   2024  Antonella Arellano is a 18 y.o. female who is here today for follow up after EGD. She has been feeling much better. No significant abdominal pain, nausea or diarrhea. She still avoids meat products. Denies any GI bleeding.     Interval History:  2024  Antonella Carroll is a 18 y.o. female who is here today for follow up for abdominal pain. She has been doing better but has had a few episodes since her last visit where she had severe epigastric pain, nausea with vomiting and 1-2 episodes of diarrhea that may last 8-10 hours then resolves. She denies diarrhea at any other time, she typically has 1 soft bowel movement per day. She denies any GI bleeding. The episodes occurred after she had Thanksgiving dinner and after she had eaten jay or steak. Stress will cause the symptoms as well. Her PCP stopped her Pantoprazole 40 mg and changed her to Omeprazole 20 mg. She was taking it daily and stopped it 2 weeks ago. She has been not had any symptoms since she stopped the Omeprazole. Denies any history of weight loss. No family history of IBD.      2023  She has a history of epigastric pain that started 4 months ago that has now improved. She was started on Pantoprazole 40 mg once a day in May and her symptoms are much better.  She is now having the pain about once a month that may last 1 day or so. Eating certain foods, such as spicy foods, made symptoms worse. She had nausea associated with the pain, no vomiting. Denies reflux. No history of reflux. Denies any GI bleeding. She states she had a negative H. Pylori breath test at her PCP but we do not have those results. Denies any NSAID use.      Denies constipation, diarrhea or rectal  bleeding. No family history of GI malignancy.     Subjective      Past Medical History:   Diagnosis Date    Anxiety and depression     Arrhythmia     I feel like i have heart palps/flutters    Asthma     as a child    GERD (gastroesophageal reflux disease)     Heart murmur     I had one when i was younger.    Hypoglycemia     Meningitis 2015    Peptic ulceration May 2023    Sickle cell trait     Sprain     left ankle     Past Surgical History:   Procedure Laterality Date    BURSECTOMY Right 04/27/2017    Procedure: OPEN PREPATELLAR BURSECTOMY AND REPAIR RIGHT;  Surgeon: Jevon Braga MD;  Location: [facility] OR;  Service:     EAR TUBES      ENDOSCOPY      at age 6    ENDOSCOPY N/A 3/1/2024    Procedure: ESOPHAGOGASTRODUODENOSCOPY WITH BIOPSY;  Surgeon: Paddy Horn MD;  Location: [facility] ENDOSCOPY;  Service: Gastroenterology;  Laterality: N/A;    OTHER SURGICAL HISTORY  2015    SPINAL TAP    TYMPANOPLASTY Right     X2     Family History   Problem Relation Age of Onset    Anxiety disorder Mother     Asthma Mother     Birth defects Brother         2 heart valves instead of 3    Arthritis Maternal Grandmother     Scoliosis Maternal Grandfather     Osteoarthritis Other     Osteoporosis Other     Asthma Other     COPD Other     Abnormal EKG Other     Colon cancer Neg Hx      Social History     Socioeconomic History    Marital status: Single   Tobacco Use    Smoking status: Never     Passive exposure: Never    Smokeless tobacco: Never   Vaping Use    Vaping status: Never Used   Substance and Sexual Activity    Alcohol use: Yes     Alcohol/week: 2.0 standard drinks of alcohol     Types: 2 Drinks containing 0.5 oz of alcohol per week    Drug use: No    Sexual activity: Defer       Current Outpatient Medications:     dicyclomine (BENTYL) 20 MG tablet, Take 1 tablet by mouth 3 (Three) Times a Day As Needed for Abdominal Cramping., Disp: 30 tablet, Rfl: 1    escitalopram (LEXAPRO) 20 MG tablet, Take 1 tablet by  "mouth Daily., Disp: 90 tablet, Rfl: 1    norethindrone (MICRONOR) 0.35 MG tablet, Take 1 tablet by mouth Daily. (Patient taking differently: Take 1 tablet by mouth every night at bedtime.), Disp: 28 tablet, Rfl: 12    omeprazole (priLOSEC) 20 MG capsule, TAKE 1 CAPSULE BY MOUTH DAILY, Disp: 90 capsule, Rfl: 0    ondansetron (ZOFRAN) 4 MG tablet, Take 1 tablet by mouth Every 8 (Eight) Hours As Needed for Nausea or Vomiting., Disp: 30 tablet, Rfl: 1    No Known Allergies    The following portions of the patient's history were reviewed and updated as appropriate: allergies, current medications, past family history, past medical history, past social history, past surgical history and problem list.  Objective     Physical Exam  Vitals and nursing note reviewed.   Constitutional:       General: She is not in acute distress.     Appearance: Normal appearance. She is well-developed.   HENT:      Head: Normocephalic and atraumatic.      Mouth/Throat:      Mouth: Mucous membranes are not pale, not dry and not cyanotic.   Eyes:      General: Lids are normal.   Neck:      Trachea: Trachea normal.   Cardiovascular:      Rate and Rhythm: Normal rate.   Pulmonary:      Effort: Pulmonary effort is normal. No respiratory distress.      Breath sounds: Normal breath sounds.   Abdominal:      Tenderness: There is no abdominal tenderness.   Skin:     General: Skin is warm and dry.   Neurological:      Mental Status: She is alert and oriented to person, place, and time.   Psychiatric:         Mood and Affect: Mood normal.         Speech: Speech normal.         Behavior: Behavior normal. Behavior is cooperative.       Vitals:    05/13/24 1623   BP: 90/58   Pulse: 90   Resp: 16   SpO2: 98%   Weight: 56.7 kg (125 lb)   Height: 157.5 cm (62\")     Body mass index is 22.86 kg/m².     Results Review:   I reviewed the patient's new clinical results.    Admission on 03/01/2024, Discharged on 03/01/2024   Component Date Value Ref Range Status    " HCG, Urine, QL 2024 Negative  Negative Final    Lot Number 2024 #[id number]   Final    Internal Positive Control 2024 Passed  Positive, Passed Final    Internal Negative Control 2024 Passed  Negative, Passed Final    Expiration Date 2024   Final    Reference Lab Report 2024    Final                    Value:[facility]  [address] [place] [address]  Phone: [phone] or [phone]  Fax: [phone]  Giovany Anthony M.D., Medical Director    PATIENT NAME                                     LABORATORY [address]  [id number]                          [id number]  [id number]                                 AGE                    SEX   SSN              CLIENT REF #  [facility]                    18        2005      F     [id number]      [id number]    [address]                        REQUESTING M.D.           ATTENDING M.D.         COPY TO.   [address]                                MICHELLE GOODRICH [address]  DATE COLLECTED            DATE RECEIVED          DATE REPORTED  2024    DIAGNOSIS:  A.     DUODENUM, BIOPSY:  Duodenal mucosa with no significant histopathologic abnormality  No                           evidence of dysplasia, carcinoma, or villous atrophy    B.     ANTRUM AND BODY, BIOPSY:  Gastric mucosa with no significant histopathologic abnormality  No H. pylori organisms identified on routine stains  Negative for intestinal metaplasia, dysplasia, or carcinoma       REVIEWED, DIAGNOSED AND ELECTRONICALLY  SIGNED BY:    Khris Liu M.D.,F.C.A.P.  CPT CODES:  88305x2     Office Visit on 2024   Component Date Value Ref Range Status    Ferritin 2024 24.30  13.00 - 150.00 ng/mL Final    Results may be falsely decreased if patient taking Biotin.    25  Hydroxy, Vitamin D 02/26/2024 25.0 (L)  30.0 - 100.0 ng/ml Final    Vitamin B-12 02/26/2024 366  211 - 946 pg/mL Final    Results may be falsely increased if patient taking Biotin.    Folate 02/26/2024 8.21  4.78 - 24.20 ng/mL Final    Results may be falsely increased if patient taking Biotin.    Class Description 02/26/2024 Comment   Final    IgE 02/26/2024 25  6 - 495 IU/mL Final    D229-MnU Alpha-Gal 02/26/2024 <0.10  Class 0 kU/L Final    Beef 02/26/2024 <0.10  Class 0 kU/L Final    Pork 02/26/2024 <0.10  Class 0 kU/L Final    Lamb 02/26/2024 <0.10  Class 0 kU/L Final      Comprehensive Metabolic Panel (11/18/2023 08:57)  CBC (No Diff) (11/18/2023 08:57)  TSH Rfx On Abnormal To Free T4 (11/18/2023 08:57)  Hepatitis C Antibody (11/18/2023 08:57)  HIV-1/O/2 Ag/Ab w Reflex (11/18/2023 08:57)    US Gallbladder 8/2/2023  Normal right upper quadrant ultrasound.     EGD dated 3/1/2024 per Dr. Horn  - Normal oropharynx.  - Z-line regular, 32 cm from the incisors.  - No gross lesions in the entire esophagus.  - A small amount of food (residue) in the stomach.  - Erosive gastropathy with no stigmata of recent bleeding. Biopsied.  - Normal ampulla, duodenal bulb, first portion of the duodenum, second portion of the duodenum and third portion of the duodenum. Biopsied.  - NSAID induced gastropathy suspected  - May have mild gastric delay  - Functional GI disorder suspected  A.     DUODENUM, BIOPSY:  Duodenal mucosa with no significant histopathologic abnormality  No evidence of dysplasia, carcinoma, or villous atrophy  B.     ANTRUM AND BODY, BIOPSY:  Gastric mucosa with no significant histopathologic abnormality  No H. pylori organisms identified on routine stains  Negative for intestinal metaplasia, dysplasia, or carcinoma     Assessment / Plan      1. Epigastric pain  2. Nausea  3. Delayed gastric emptying  4. Diarrhea, unspecified type  5. Functional gastrointestinal disorder  6. Irritable bowel syndrome with  diarrhea  Symptoms have improved. She is not having any significant abdominal pain, nausea or diarrhea at this time. She does continue to avoid meat as it had made her symptoms worse in the past. No GI bleeding. No weight loss. Basic labs unremarkable. TSH normal. Lipase normal.  Alpha gal testing negative.  H. pylori breath test negative. Abdominal ultrasound on 8/2/2023 unremarkable.  EGD dated 3/1/2024 with small amount of food residue in the stomach noted and erosive gastropathy with no stigmata of bleeding.  Biopsies unremarkable.  NSAID induced gastropathy suspected.  May have mild gastric delay.  Functional gastrointestinal disorder/IBS suspected.  Low FODMAP diet  Zofran, Bentyl and omeprazole as needed  Advised to eat a softer diet 4-5 very small meals throughout the day, avoid large meals.  Celiac panel  Fecal calprotectin and pancreatic elastase  Avoid NSAIDs  Consider CTAP and colonoscopy if symptoms return/worsen.    - IgA; Future  - Tissue Transglutaminase, IgA; Future  - Calprotectin, Fecal - Stool, Per Rectum; Future  - Pancreatic Elastase, Fecal - Stool, Per Rectum; Future    Patient Instructions   Antireflux measures: Avoid fried, fatty foods, alcohol, chocolate, coffee, tea,  soft drinks, all carbonated beverages (including sparkling water), peppermint and spearmint, spicy foods, tomatoes and tomato based foods, onions, peppers, and garlic.   Other antireflux measures include weight reduction if overweight, avoiding tight clothing around the abdomen, elevating the head of the bed 6 inches with blocks under the head board, and don't drink or eat before going to bed and avoid lying down immediately after meals.  Omeprazole 20 mg 1 by mouth daily as needed.   Zofran 4 mg 1 po every 8 hours as needed for nausea.   Bentyl 20 mg 1 po 3 times a day as needed for abdominal cramping.   May take Imodium over the counter as needed for diarrhea.  High fiber, low fat diet with liberal water intake.   Low  FODMAP diet - avoid all dairy. May use lactose free/dairy free alternatives such as almond milk, rice milk, oat milk, etc.    Metamucil 1 packet/scoop daily or fiber gummies 2-4 per day.   Avoid NSAIDs, including Ibuprofen, Motrin, Advil, Aleve, Naprosyn, etc.   May use Tylenol/Acetaminophen products.   Labs  Stool studies  Follow up: 4 months        Low-FODMAP Eating Plan    FODMAP stands for fermentable oligosaccharides, disaccharides, monosaccharides, and polyols. These are sugars that are hard for some people to digest. A low-FODMAP eating plan may help some people who have irritable bowel syndrome (IBS) and certain other bowel (intestinal) diseases to manage their symptoms.  This meal plan can be complicated to follow. Work with a diet and nutrition specialist (dietitian) to make a low-FODMAP eating plan that is right for you. A dietitian can help make sure that you get enough nutrition from this diet.  What are tips for following this plan?  Reading food labels  Check labels for hidden FODMAPs such as:  High-fructose syrup.  Honey.  Agave.  Natural fruit flavors.  Onion or garlic powder.  Choose low-FODMAP foods that contain 3-4 grams of fiber per serving.  Check food labels for serving sizes. Eat only one serving at a time to make sure FODMAP levels stay low.  Shopping  Shop with a list of foods that are recommended on this diet and make a meal plan.  Meal planning  Follow a low-FODMAP eating plan for up to 6 weeks, or as told by your health care provider or dietitian.  To follow the eating plan:  Eliminate high-FODMAP foods from your diet completely. Choose only low-FODMAP foods to eat. You will do this for 2-6 weeks.  Gradually reintroduce high-FODMAP foods into your diet one at a time. Most people should wait a few days before introducing the next new high-FODMAP food into their meal plan. Your dietitian can recommend how quickly you may reintroduce foods.  Keep a daily record of what and how much you eat  "and drink. Make note of any symptoms that you have after eating.  Review your daily record with a dietitian regularly to identify which foods you can eat and which foods you should avoid.  General tips  Drink enough fluid each day to keep your urine pale yellow.  Avoid processed foods. These often have added sugar and may be high in FODMAPs.  Avoid most dairy products, whole grains, and sweeteners.  Work with a dietitian to make sure you get enough fiber in your diet.  Avoid high FODMAP foods at meals to manage symptoms.     Recommended foods  Fruits  Bananas, oranges, tangerines, francisco, limes, blueberries, raspberries, strawberries, grapes, cantaloupe, honeydew melon, kiwi, papaya, passion fruit, and pineapple. Limited amounts of dried cranberries, banana chips, and shredded coconut.  Vegetables  Eggplant, zucchini, cucumber, peppers, green beans, bean sprouts, lettuce, arugula, kale, Swiss chard, spinach, juan luis greens, bok charis, summer squash, potato, and tomato. Limited amounts of corn, carrot, and sweet potato. Green parts of scallions.  Grains  Gluten-free grains, such as rice, oats, buckwheat, quinoa, corn, polenta, and millet. Gluten-free pasta, bread, or cereal. Rice noodles. Corn tortillas.  Meats and other proteins  Unseasoned beef, pork, poultry, or fish. Eggs. [place]. Tofu (firm) and tempeh. Limited amounts of nuts and seeds, such as almonds, walnuts, brazil nuts, pecans, peanuts, nut butters, pumpkin seeds, brian seeds, and sunflower seeds.  Dairy  Lactose-free milk, yogurt, and kefir. Lactose-free cottage cheese and ice cream. Non-dairy milks, such as almond, coconut, hemp, and rice milk. Non-dairy yogurt. Limited amounts of goat cheese, brie, mozzarella, parmesan, swiss, and other hard cheeses.  Fats and oils  Butter-free spreads. Vegetable oils, such as olive, canola, and sunflower oil.  Seasoning and other foods  Artificial sweeteners with names that do not end in \"ol,\" such as aspartame, " saccharine, and stevia. Maple syrup, white table sugar, raw sugar, brown sugar, and molasses. Mayonnaise, soy sauce, and tamari. Fresh basil, coriander, parsley, rosemary, and thyme.  Beverages  Water and mineral water. Sugar-sweetened soft drinks. Small amounts of orange juice or cranberry juice. Black and green tea. Most dry liza. Coffee.  The items listed above may not be a complete list of foods and beverages you can eat. Contact a dietitian for more information.     Foods to avoid  Fruits  Fresh, dried, and juiced forms of apple, pear, watermelon, peach, plum, cherries, apricots, blackberries, boysenberries, figs, nectarines, and yeimi. Avocado.  Vegetables  Chicory root, artichoke, asparagus, cabbage, snow peas, [place] sprouts, broccoli, sugar snap peas, mushrooms, celery, and cauliflower. Onions, garlic, leeks, and the white part of scallions.  Grains  Wheat, including kamut, durum, and semolina. Barley and bulgur. Couscous. Wheat-based cereals. Wheat noodles, bread, crackers, and pastries.  Meats and other proteins  Fried or fatty meat. Sausage. Cashews and pistachios. Soybeans, baked beans, black beans, chickpeas, kidney beans, jocy beans, navy beans, lentils, black-eyed peas, and split peas.  Dairy  Milk, yogurt, ice cream, and soft cheese. Cream and sour cream. Milk-based sauces. Custard. Buttermilk. Soy milk.  Seasoning and other foods  Any sugar-free gum or candy. Foods that contain artificial sweeteners such as sorbitol, mannitol, isomalt, or xylitol. Foods that contain honey, high-fructose corn syrup, or agave. Bouillon, vegetable stock, beef stock, and chicken stock. Garlic and onion powder. Condiments made with onion, such as hummus, chutney, pickles, relish, salad dressing, and salsa. Tomato paste.  Beverages  Chicory-based drinks. Coffee substitutes. Chamomile tea. Fennel tea. Sweet or fortified liza such as port or ronnie. Diet soft drinks made with isomalt, mannitol, maltitol, sorbitol, or  xylitol. Apple, pear, and yeimi juice. Juices with high-fructose corn syrup.  The items listed above may not be a complete list of foods and beverages you should avoid. Contact a dietitian for more information.     Summary  FODMAP stands for fermentable oligosaccharides, disaccharides, monosaccharides, and polyols. These are sugars that are hard for some people to digest.  A low-FODMAP eating plan is a short-term diet that helps to ease symptoms of certain bowel diseases.  The eating plan usually lasts up to 6 weeks. After that, high-FODMAP foods are reintroduced gradually and one at a time. This can help you find out which foods may be causing symptoms.  A low-FODMAP eating plan can be complicated. It is best to work with a dietitian who has experience with this type of plan.  This information is not intended to replace advice given to you by your health care provider. Make sure you discuss any questions you have with your health care provider.  Document Revised: 05/06/2021 Document Reviewed: 05/06/2021  [employer] Patient Education © 2023 [employer]     Madelyn Osullivan, APRN  5/13/2024    Please note that portions of this note were completed with a voice recognition program.

## 2024-05-13 NOTE — PATIENT INSTRUCTIONS
Antireflux measures: Avoid fried, fatty foods, alcohol, chocolate, coffee, tea,  soft drinks, all carbonated beverages (including sparkling water), peppermint and spearmint, spicy foods, tomatoes and tomato based foods, onions, peppers, and garlic.   Other antireflux measures include weight reduction if overweight, avoiding tight clothing around the abdomen, elevating the head of the bed 6 inches with blocks under the head board, and don't drink or eat before going to bed and avoid lying down immediately after meals.  Omeprazole 20 mg 1 by mouth daily as needed.   Zofran 4 mg 1 po every 8 hours as needed for nausea.   Bentyl 20 mg 1 po 3 times a day as needed for abdominal cramping.   May take Imodium over the counter as needed for diarrhea.  High fiber, low fat diet with liberal water intake.   Low FODMAP diet - avoid all dairy. May use lactose free/dairy free alternatives such as almond milk, rice milk, oat milk, etc.    Metamucil 1 packet/scoop daily or fiber gummies 2-4 per day.   Avoid NSAIDs, including Ibuprofen, Motrin, Advil, Aleve, Naprosyn, etc.   May use Tylenol/Acetaminophen products.   Labs  Stool studies  Follow up: 4 months        Low-FODMAP Eating Plan    FODMAP stands for fermentable oligosaccharides, disaccharides, monosaccharides, and polyols. These are sugars that are hard for some people to digest. A low-FODMAP eating plan may help some people who have irritable bowel syndrome (IBS) and certain other bowel (intestinal) diseases to manage their symptoms.  This meal plan can be complicated to follow. Work with a diet and nutrition specialist (dietitian) to make a low-FODMAP eating plan that is right for you. A dietitian can help make sure that you get enough nutrition from this diet.  What are tips for following this plan?  Reading food labels  Check labels for hidden FODMAPs such as:  High-fructose syrup.  Honey.  Agave.  Natural fruit flavors.  Onion or garlic powder.  Choose low-FODMAP foods  that contain 3-4 grams of fiber per serving.  Check food labels for serving sizes. Eat only one serving at a time to make sure FODMAP levels stay low.  Shopping  Shop with a list of foods that are recommended on this diet and make a meal plan.  Meal planning  Follow a low-FODMAP eating plan for up to 6 weeks, or as told by your health care provider or dietitian.  To follow the eating plan:  Eliminate high-FODMAP foods from your diet completely. Choose only low-FODMAP foods to eat. You will do this for 2-6 weeks.  Gradually reintroduce high-FODMAP foods into your diet one at a time. Most people should wait a few days before introducing the next new high-FODMAP food into their meal plan. Your dietitian can recommend how quickly you may reintroduce foods.  Keep a daily record of what and how much you eat and drink. Make note of any symptoms that you have after eating.  Review your daily record with a dietitian regularly to identify which foods you can eat and which foods you should avoid.  General tips  Drink enough fluid each day to keep your urine pale yellow.  Avoid processed foods. These often have added sugar and may be high in FODMAPs.  Avoid most dairy products, whole grains, and sweeteners.  Work with a dietitian to make sure you get enough fiber in your diet.  Avoid high FODMAP foods at meals to manage symptoms.     Recommended foods  Fruits  Bananas, oranges, tangerines, [place], limes, blueberries, raspberries, strawberries, grapes, cantaloupe, honeydew melon, kiwi, papaya, passion fruit, and pineapple. Limited amounts of dried cranberries, banana chips, and shredded coconut.  Vegetables  Eggplant, zucchini, cucumber, peppers, green beans, bean sprouts, lettuce, arugula, kale, Swiss chard, spinach, juan luis greens, bok charis, summer squash, potato, and tomato. Limited amounts of corn, carrot, and sweet potato. Green parts of scallions.  Grains  Gluten-free grains, such as rice, oats, buckwheat, quinoa, corn,  "polenta, and millet. Gluten-free pasta, bread, or cereal. Rice noodles. Corn tortillas.  Meats and other proteins  Unseasoned beef, pork, poultry, or fish. Eggs. [place]. Tofu (firm) and tempeh. Limited amounts of nuts and seeds, such as almonds, walnuts, brazil nuts, pecans, peanuts, nut butters, pumpkin seeds, brian seeds, and sunflower seeds.  Dairy  Lactose-free milk, yogurt, and kefir. Lactose-free cottage cheese and ice cream. Non-dairy milks, such as almond, coconut, hemp, and rice milk. Non-dairy yogurt. Limited amounts of goat cheese, brie, mozzarella, parmesan, swiss, and other hard cheeses.  Fats and oils  Butter-free spreads. Vegetable oils, such as olive, canola, and sunflower oil.  Seasoning and other foods  Artificial sweeteners with names that do not end in \"ol,\" such as aspartame, saccharine, and stevia. Maple syrup, white table sugar, raw sugar, brown sugar, and molasses. Mayonnaise, soy sauce, and tamari. Fresh basil, coriander, parsley, rosemary, and thyme.  Beverages  Water and mineral water. Sugar-sweetened soft drinks. Small amounts of orange juice or cranberry juice. Black and green tea. Most dry liza. Coffee.  The items listed above may not be a complete list of foods and beverages you can eat. Contact a dietitian for more information.     Foods to avoid  Fruits  Fresh, dried, and juiced forms of apple, pear, watermelon, peach, plum, cherries, apricots, blackberries, boysenberries, figs, nectarines, and yeimi. Avocado.  Vegetables  Chicory root, artichoke, asparagus, cabbage, snow peas, [place] sprouts, broccoli, sugar snap peas, mushrooms, celery, and cauliflower. Onions, garlic, leeks, and the white part of scallions.  Grains  Wheat, including kamut, durum, and semolina. Barley and bulgur. Couscous. Wheat-based cereals. Wheat noodles, bread, crackers, and pastries.  Meats and other proteins  Fried or fatty meat. Sausage. Cashews and pistachios. Soybeans, baked beans, black beans, chickpeas, " kidney beans, jocy beans, navy beans, lentils, black-eyed peas, and split peas.  Dairy  Milk, yogurt, ice cream, and soft cheese. Cream and sour cream. Milk-based sauces. Custard. Buttermilk. Soy milk.  Seasoning and other foods  Any sugar-free gum or candy. Foods that contain artificial sweeteners such as sorbitol, mannitol, isomalt, or xylitol. Foods that contain honey, high-fructose corn syrup, or agave. Bouillon, vegetable stock, beef stock, and chicken stock. Garlic and onion powder. Condiments made with onion, such as hummus, chutney, pickles, relish, salad dressing, and salsa. Tomato paste.  Beverages  Chicory-based drinks. Coffee substitutes. Chamomile tea. Fennel tea. Sweet or fortified liza such as port or ronnie. Diet soft drinks made with isomalt, mannitol, maltitol, sorbitol, or xylitol. Apple, pear, and yeimi juice. Juices with high-fructose corn syrup.  The items listed above may not be a complete list of foods and beverages you should avoid. Contact a dietitian for more information.     Summary  FODMAP stands for fermentable oligosaccharides, disaccharides, monosaccharides, and polyols. These are sugars that are hard for some people to digest.  A low-FODMAP eating plan is a short-term diet that helps to ease symptoms of certain bowel diseases.  The eating plan usually lasts up to 6 weeks. After that, high-FODMAP foods are reintroduced gradually and one at a time. This can help you find out which foods may be causing symptoms.  A low-FODMAP eating plan can be complicated. It is best to work with a dietitian who has experience with this type of plan.  This information is not intended to replace advice given to you by your health care provider. Make sure you discuss any questions you have with your health care provider.  Document Revised: 05/06/2021 Document Reviewed: 05/06/2021  Elsevier Patient Education © 2023 [employer]

## 2024-05-14 LAB — IGA1 MFR SER: 163 MG/DL (ref 70–400)

## 2024-05-15 LAB — TTG IGA SER-ACNC: <2 U/ML (ref 0–3)

## 2024-07-22 RX ORDER — ESCITALOPRAM OXALATE 20 MG/1
20 TABLET ORAL DAILY
Qty: 90 TABLET | Refills: 1 | Status: SHIPPED | OUTPATIENT
Start: 2024-07-22

## 2024-08-09 ENCOUNTER — OFFICE VISIT (OUTPATIENT)
Dept: INTERNAL MEDICINE | Facility: CLINIC | Age: 19
End: 2024-08-09
Payer: COMMERCIAL

## 2024-08-09 VITALS
HEIGHT: 62 IN | DIASTOLIC BLOOD PRESSURE: 68 MMHG | HEART RATE: 82 BPM | OXYGEN SATURATION: 98 % | WEIGHT: 132 LBS | TEMPERATURE: 98.5 F | SYSTOLIC BLOOD PRESSURE: 114 MMHG | BODY MASS INDEX: 24.29 KG/M2

## 2024-08-09 DIAGNOSIS — F41.9 ANXIETY: ICD-10-CM

## 2024-08-09 DIAGNOSIS — R05.1 ACUTE COUGH: Primary | ICD-10-CM

## 2024-08-09 PROCEDURE — 99214 OFFICE O/P EST MOD 30 MIN: CPT | Performed by: NURSE PRACTITIONER

## 2024-08-09 RX ORDER — DESVENLAFAXINE 25 MG/1
25 TABLET, EXTENDED RELEASE ORAL DAILY
Qty: 30 TABLET | Refills: 2 | Status: SHIPPED | OUTPATIENT
Start: 2024-08-09

## 2024-08-09 RX ORDER — ESCITALOPRAM OXALATE 5 MG/1
5 TABLET ORAL DAILY
Qty: 7 TABLET | Refills: 0 | Status: SHIPPED | OUTPATIENT
Start: 2024-08-09

## 2024-08-09 NOTE — PROGRESS NOTES
"Chief Complaint   Patient presents with    Cough     X couple weeks, has gotten better    Anxiety     Lexapro doesn't seem to be working as well     Subjective   Antonella Arellano is a 18 y.o. female presenting for cough and anxiety.    History of Present Illness  She began coughing approximately 2 weeks ago, which initially presented as a tickle in her throat. The following morning, she developed a persistent cough that persisted until Tuesday. The cough became more productive, causing her to clear her throat throughout the day. She sought medical attention at an urgent care facility a month ago and was prescribed Bromfed which seemed to provide some relief. She is requesting a chest x-ray due to her brother's recent bout of pneumonia. She was around him briefly.  She denies having fevers or chills, wheezing, shortness of air, or chest tightness. Her appetite remains normal.  Her cough has improved as of today.    She reports that Lexapro does not seem to be effective for her. Over the past month, she has been experiencing unexplained anxiety, without any specific triggers. She describes the sensation as similar to imposture syndrome, which lasts for about 20 minutes. She has not previously tried any medication for her anxiety other than Lexapro. She has been on this dose for about 1.5 years.  She is interested in trying a new medication.  Her mother and grandfather take something for anxiety and depression but she is unsure of the name.      The following portions of the patient's history were reviewed and updated as appropriate: allergies, current medications, past family history, past medical history, past social history, past surgical history and problem list.    Objective   /68   Pulse 82   Temp 98.5 °F (36.9 °C)   Ht 157.5 cm (62\")   Wt 59.9 kg (132 lb)   SpO2 98%   BMI 24.14 kg/m²   Body mass index is 24.14 kg/m².  BMI is within normal parameters. No other follow-up for BMI required.   "   Physical Exam  Vitals and nursing note reviewed.   Constitutional:       General: She is not in acute distress.     Appearance: Normal appearance.   HENT:      Head: Normocephalic and atraumatic.      Right Ear: Ear canal and external ear normal. A middle ear effusion is present.      Left Ear: Ear canal and external ear normal. A middle ear effusion is present.      Nose: Nose normal. No congestion or rhinorrhea.      Mouth/Throat:      Mouth: Mucous membranes are moist.      Pharynx: Oropharynx is clear.   Eyes:      General:         Right eye: No discharge.         Left eye: No discharge.      Extraocular Movements: Extraocular movements intact.      Conjunctiva/sclera: Conjunctivae normal.      Pupils: Pupils are equal, round, and reactive to light.   Cardiovascular:      Rate and Rhythm: Normal rate and regular rhythm.      Heart sounds: Normal heart sounds.   Pulmonary:      Effort: Pulmonary effort is normal. No respiratory distress.      Breath sounds: Normal breath sounds. No stridor. No wheezing, rhonchi or rales.   Chest:      Chest wall: No tenderness.   Musculoskeletal:         General: Normal range of motion.      Cervical back: Normal range of motion and neck supple. No tenderness.   Lymphadenopathy:      Cervical: No cervical adenopathy.   Skin:     General: Skin is warm and dry.      Findings: No rash.   Neurological:      General: No focal deficit present.      Mental Status: She is alert and oriented to person, place, and time. Mental status is at baseline.   Psychiatric:         Mood and Affect: Mood normal.         Behavior: Behavior normal.         Thought Content: Thought content normal.         Judgment: Judgment normal.       Assessment & Plan   Antonella [facility] is here today and the following problems have been addressed:      Diagnoses and all orders for this visit:    1. Acute cough (Primary)  -     XR Chest PA & Lateral    2. Anxiety  -     escitalopram (Lexapro) 5 MG tablet; Take  1 tablet by mouth Daily.  Dispense: 7 tablet; Refill: 0  -     Desvenlafaxine Succinate ER 25 MG tablet sustained-release 24 hour; Take 1 tablet by mouth Daily.  Dispense: 30 tablet; Refill: 2      Acute cough: Symptoms appear to be improving, patient is requesting a chest x-ray to rule out pneumonia, her brother was recently diagnosed and she was around him briefly.  Her lung sounds are normal.  If her cough persists provided order for x-ray to be completed.  Can continue Bromfed as needed, Mucinex, push lots of fluids, practice deep breathing exercises.    Anxiety: Taper Lexapro.  Take half tablet of current dose, 10 mg daily x 2 weeks.  Then take 5 mg x 1 week.  When starting the 5 mg dosage, can go ahead and initiate desvenlafaxine or Pristiq 25 mg daily.  Follow-up in 6 weeks after initiation of Pristiq.    Follow Up   Return in about 8 weeks (around 10/4/2024) for Anxiety.  Patient was given instructions and counseling regarding her condition or for health maintenance advice. Please see specific information pulled into the AVS if appropriate.     Kayleen NUNEZ  [facility]      Patient or patient representative verbalized consent for the use of Ambient Listening during the visit with  ENRIQUE Fry for chart documentation. 8/9/2024  18:05 EDT

## 2024-09-16 ENCOUNTER — CLINICAL SUPPORT (OUTPATIENT)
Dept: INTERNAL MEDICINE | Facility: CLINIC | Age: 19
End: 2024-09-16
Payer: COMMERCIAL

## 2024-09-16 DIAGNOSIS — N39.0 ACUTE UTI: Primary | ICD-10-CM

## 2024-09-16 DIAGNOSIS — R30.0 DYSURIA: Primary | ICD-10-CM

## 2024-09-16 LAB
BILIRUB BLD-MCNC: NEGATIVE MG/DL
CLARITY, POC: ABNORMAL
COLOR UR: ABNORMAL
EXPIRATION DATE: ABNORMAL
GLUCOSE UR STRIP-MCNC: NEGATIVE MG/DL
KETONES UR QL: NEGATIVE
LEUKOCYTE EST, POC: ABNORMAL
Lab: ABNORMAL
NITRITE UR-MCNC: NEGATIVE MG/ML
PH UR: 6.5 [PH] (ref 5–8)
PROT UR STRIP-MCNC: NEGATIVE MG/DL
RBC # UR STRIP: ABNORMAL /UL
SP GR UR: 1.01 (ref 1–1.03)
UROBILINOGEN UR QL: ABNORMAL

## 2024-09-16 PROCEDURE — 81003 URINALYSIS AUTO W/O SCOPE: CPT | Performed by: NURSE PRACTITIONER

## 2024-09-16 RX ORDER — NITROFURANTOIN 25; 75 MG/1; MG/1
100 CAPSULE ORAL 2 TIMES DAILY
Qty: 10 CAPSULE | Refills: 0 | Status: SHIPPED | OUTPATIENT
Start: 2024-09-16 | End: 2024-09-21

## 2024-09-18 LAB
BACTERIA UR CULT: NORMAL
BACTERIA UR CULT: NORMAL

## 2024-09-24 DIAGNOSIS — Z30.011 ENCOUNTER FOR INITIAL PRESCRIPTION OF CONTRACEPTIVE PILLS: ICD-10-CM

## 2024-09-24 RX ORDER — ACETAMINOPHEN AND CODEINE PHOSPHATE 120; 12 MG/5ML; MG/5ML
1 SOLUTION ORAL DAILY
Qty: 84 TABLET | Refills: 3 | Status: SHIPPED | OUTPATIENT
Start: 2024-09-24

## 2024-10-27 DIAGNOSIS — F41.9 ANXIETY: ICD-10-CM

## 2024-10-28 RX ORDER — DESVENLAFAXINE 25 MG/1
25 TABLET, EXTENDED RELEASE ORAL DAILY
Qty: 90 TABLET | Refills: 1 | Status: SHIPPED | OUTPATIENT
Start: 2024-10-28

## 2024-12-17 ENCOUNTER — OFFICE VISIT (OUTPATIENT)
Dept: INTERNAL MEDICINE | Facility: CLINIC | Age: 19
End: 2024-12-17
Payer: COMMERCIAL

## 2024-12-17 VITALS
HEART RATE: 80 BPM | WEIGHT: 130 LBS | DIASTOLIC BLOOD PRESSURE: 68 MMHG | BODY MASS INDEX: 23.92 KG/M2 | SYSTOLIC BLOOD PRESSURE: 116 MMHG | OXYGEN SATURATION: 96 % | HEIGHT: 62 IN | TEMPERATURE: 97.9 F

## 2024-12-17 DIAGNOSIS — F41.9 ANXIETY: ICD-10-CM

## 2024-12-17 DIAGNOSIS — K58.0 IRRITABLE BOWEL SYNDROME WITH DIARRHEA: ICD-10-CM

## 2024-12-17 DIAGNOSIS — Z13.220 SCREENING FOR CHOLESTEROL LEVEL: ICD-10-CM

## 2024-12-17 DIAGNOSIS — Z13.228 SCREENING FOR ENDOCRINE, METABOLIC AND IMMUNITY DISORDER: ICD-10-CM

## 2024-12-17 DIAGNOSIS — Z12.4 PAP SMEAR FOR CERVICAL CANCER SCREENING: ICD-10-CM

## 2024-12-17 DIAGNOSIS — Z13.0 SCREENING FOR ENDOCRINE, METABOLIC AND IMMUNITY DISORDER: ICD-10-CM

## 2024-12-17 DIAGNOSIS — Z13.0 SCREENING FOR DEFICIENCY ANEMIA: ICD-10-CM

## 2024-12-17 DIAGNOSIS — E55.9 VITAMIN D DEFICIENCY: ICD-10-CM

## 2024-12-17 DIAGNOSIS — Z13.29 SCREENING FOR ENDOCRINE, METABOLIC AND IMMUNITY DISORDER: ICD-10-CM

## 2024-12-17 DIAGNOSIS — Z00.00 ANNUAL PHYSICAL EXAM: Primary | ICD-10-CM

## 2024-12-17 DIAGNOSIS — K92.9 FUNCTIONAL GASTROINTESTINAL DISORDER: ICD-10-CM

## 2024-12-17 PROCEDURE — 99395 PREV VISIT EST AGE 18-39: CPT | Performed by: NURSE PRACTITIONER

## 2024-12-17 RX ORDER — DESVENLAFAXINE 25 MG/1
25 TABLET, EXTENDED RELEASE ORAL DAILY
Qty: 90 TABLET | Refills: 3 | Status: SHIPPED | OUTPATIENT
Start: 2025-04-21 | End: 2024-12-17

## 2024-12-17 RX ORDER — DESVENLAFAXINE 25 MG/1
25 TABLET, EXTENDED RELEASE ORAL DAILY
Qty: 90 TABLET | Refills: 1 | Status: SHIPPED | OUTPATIENT
Start: 2025-04-21 | End: 2024-12-18

## 2024-12-17 NOTE — PROGRESS NOTES
Female Physical Note      Date: 2024   Patient Name: Antonella Arellano  : 2005   MRN: [id number]     Chief Complaint:    Chief Complaint   Patient presents with    Annual Exam     And pap       History of Present Illness: Antonella Arellano is a 19 y.o. female who is here today for annual health maintenance, physical, pap smear.   History of Present Illness  History of anxiety, functional gastrointestinal disorder/IBS.  Anxiety is stable on desvenlafaxine 25 mg daily.  She has been diagnosed with a  functional GI disorder, IBS with diarrhea by GI which is stable. She eliminated grapefruit, grapes, and most red meats from her diet as she is sensitive to these foods. Since making these dietary changes, her symptoms have improved. She did get tested for alpha-gal, which was negative. An EGD was performed and was unremarkable other than some mild erosive gastropathy and biopsies normal. She follows up with GI as needed.  She takes birth control, Micronor daily. She has a period every month that lasts about 2 days and is very light without pain. She is engaged and plans to get  next May, with intentions to have children within the next few years after marriage. She is up to date with vision and needs to schedule a dental appointment. She does not want any additional testing with her Pap smear other than for HPV. Defers breast exam today. Vaccines and other screenings were reviewed.     Subjective      Review of Systems:   ROS all negative.     Past Medical History, Social History, Family History and Care Team were all reviewed with patient and updated as appropriate.     Medications:     Current Outpatient Medications:     [START ON 2025] Desvenlafaxine Succinate ER 25 MG tablet sustained-release 24 hour, Take 1 tablet by mouth Daily., Disp: 90 tablet, Rfl: 1    norethindrone (MICRONOR) 0.35 MG tablet, Take 1 tablet by mouth Daily., Disp: 84 tablet, Rfl: 3    Allergies:   No Known  "Allergies    Immunizations:  Immunization History   Administered Date(s) Administered    COVID-19 (PFIZER) Purple Cap Monovalent 03/29/2021    DTaP, Unspecified 2003, 01/16/2004, 03/26/2004, 03/22/2005, 10/03/2007    Fluzone (or Fluarix & Flulaval for VFC) >6mos 05/03/2023    Hep A, 2 Dose 01/10/2019, 02/18/2020    Hep B, Adolescent or Pediatric 2005, 2005, 03/26/2004    HiB 2003, 01/16/2004, 03/26/2004, 03/22/2005    MMR 12/22/2004, 10/03/2007    Meningococcal B,(Bexsero) 02/18/2020    Meningococcal MCV4P (Menactra) 02/18/2020    Meningococcal, Unspecified 10/22/2014    Pneumococcal, Unspecified 2003, 01/16/2004, 06/29/2004, 12/22/2004    Polio, Unspecified 2003, 01/16/2004, 09/21/2006, 10/03/2007    Tdap 10/22/2014, 12/31/2020    Varicella 03/22/2005, 10/03/2007       Tobacco Use: Low Risk  (12/17/2024)    Patient History     Smoking Tobacco Use: Never     Smokeless Tobacco Use: Never     Passive Exposure: Never       Social History     Substance and Sexual Activity   Alcohol Use Yes    Alcohol/week: 2.0 standard drinks of alcohol    Types: 2 Drinks containing 0.5 oz of alcohol per week        Social History     Substance and Sexual Activity   Drug Use No        PHQ-2 Depression Screening  Little interest or pleasure in doing things? Not at all   Feeling down, depressed, or hopeless? Not at all   PHQ-2 Total Score 0     Procedures:  UPPER GI ENDOSCOPY (03/01/2024 08:03)       Objective     Physical Exam:  Vital Signs:   Vitals:    12/17/24 0940   BP: 116/68   Pulse: 80   Temp: 97.9 °F (36.6 °C)   SpO2: 96%   Weight: 59 kg (130 lb)   Height: 157.5 cm (62\")       Physical Exam  Vitals and nursing note reviewed. Exam conducted with a chaperone present (JR Valentino MA).   Constitutional:       Appearance: Normal appearance. She is normal weight.   HENT:      Head: Normocephalic and atraumatic.      Right Ear: Tympanic membrane, ear canal and external ear normal.      Left Ear: " Tympanic membrane, ear canal and external ear normal.      Nose: Nose normal.      Mouth/Throat:      Mouth: Mucous membranes are moist.      Pharynx: Oropharynx is clear.   Eyes:      General: No scleral icterus.     Extraocular Movements: Extraocular movements intact.      Conjunctiva/sclera: Conjunctivae normal.      Pupils: Pupils are equal, round, and reactive to light.   Neck:      Thyroid: No thyromegaly.   Cardiovascular:      Rate and Rhythm: Normal rate and regular rhythm.      Pulses:           Dorsalis pedis pulses are 2+ on the right side and 2+ on the left side.        Posterior tibial pulses are 2+ on the right side and 2+ on the left side.      Heart sounds: Normal heart sounds.   Pulmonary:      Effort: Pulmonary effort is normal.      Breath sounds: Normal breath sounds.   Chest:      Comments: defers  Abdominal:      General: Abdomen is flat. Bowel sounds are normal. There is no distension.      Palpations: Abdomen is soft. There is no mass.      Tenderness: There is no abdominal tenderness. There is no guarding or rebound.      Hernia: No hernia is present.   Genitourinary:     General: Normal vulva.      Exam position: Lithotomy position.      Vagina: Normal.      Cervix: Discharge and erythema present. No friability, lesion or cervical bleeding.      Adnexa: Right adnexa normal and left adnexa normal.        Right: No mass, tenderness or fullness.          Left: No mass, tenderness or fullness.     Musculoskeletal:         General: Normal range of motion.      Cervical back: Normal range of motion and neck supple.      Right lower leg: No edema.      Left lower leg: No edema.   Lymphadenopathy:      Cervical: No cervical adenopathy.   Skin:     General: Skin is warm and dry.      Findings: No rash.   Neurological:      General: No focal deficit present.      Mental Status: She is alert and oriented to person, place, and time.      Cranial Nerves: No cranial nerve deficit.      Sensory: No  sensory deficit.      Motor: No weakness.      Coordination: Coordination normal.      Gait: Gait normal.   Psychiatric:         Mood and Affect: Mood normal.         Behavior: Behavior normal.         Thought Content: Thought content normal.         Judgment: Judgment normal.         Assessment / Plan      Assessment/Plan:   Diagnoses and all orders for this visit:    1. Annual physical exam (Primary)  -     CBC (No Diff)  -     Comprehensive Metabolic Panel  -     Lipid Panel  -     TSH Rfx On Abnormal To Free T4  -     Vitamin D,25-Hydroxy    2. Screening for deficiency anemia  -     CBC (No Diff)    3. Screening for cholesterol level  -     Lipid Panel    4. Screening for endocrine, metabolic and immunity disorder  -     Comprehensive Metabolic Panel  -     TSH Rfx On Abnormal To Free T4    5. Pap smear for cervical cancer screening  -     LIQUID-BASED PAP SMEAR WITH HPV GENOTYPING REGARDLESS OF INTERPRETATION (JORI,COR,MAD)    6. Vitamin D deficiency  -     Vitamin D,25-Hydroxy    7. Anxiety  -     Desvenlafaxine Succinate ER 25 MG tablet sustained-release 24 hour; Take 1 tablet by mouth Daily.  Dispense: 90 tablet; Refill: 1    8. Functional gastrointestinal disorder    9. Irritable bowel syndrome with diarrhea       Assessment & Plan  1. Anxiety.  Stable, will maintain her current regimen of desvenlafaxine 25 mg daily.    2. Functional gastrointestinal disorder, irritable bowel syndrome with diarrhea  Stable. She has eliminated grapefruit, grapes, and most red meats from her diet, which has helped stabilize her symptoms. She tested negative for alpha-gal, and an EGD was unremarkable other than mild erosive gastropathy with negative biopsies. She will continue to avoid foods that trigger symptom recurrence and follow up with her gastroenterologist as needed.      Healthcare Maintenance:  Counseling provided based on age appropriate USPSTF guidelines and vaccinations   Encouraged 150 minutes of exercise total  per week for heart health   Recommend balanced healthy diet   Dental visits twice per year, yearly eye exams, yearly skin assessments with dermatology   Pap every 3 years or sooner if indicated, self breast exam once per month  BMI is within normal parameters. No other follow-up for BMI required.      Antonella Mehtare voices understanding and acceptance of this advice and will call back with any further questions or concerns. AVS with preventive healthcare tips printed for patient.     Follow Up:   Return in about 1 year (around 12/17/2025) for Annual Physical.      ENRIQUE Sampson  [facility]

## 2024-12-18 LAB
25(OH)D3+25(OH)D2 SERPL-MCNC: 25 NG/ML (ref 30–100)
ALBUMIN SERPL-MCNC: 4.1 G/DL (ref 3.5–5.2)
ALBUMIN/GLOB SERPL: 1.4 G/DL
ALP SERPL-CCNC: 61 U/L (ref 39–117)
ALT SERPL-CCNC: 9 U/L (ref 1–33)
AST SERPL-CCNC: 18 U/L (ref 1–32)
BILIRUB SERPL-MCNC: 0.7 MG/DL (ref 0–1.2)
BUN SERPL-MCNC: 10 MG/DL (ref 6–20)
BUN/CREAT SERPL: 11.6 (ref 7–25)
CALCIUM SERPL-MCNC: 9.2 MG/DL (ref 8.6–10.5)
CHLORIDE SERPL-SCNC: 103 MMOL/L (ref 98–107)
CHOLEST SERPL-MCNC: 137 MG/DL (ref 0–200)
CO2 SERPL-SCNC: 26.7 MMOL/L (ref 22–29)
CREAT SERPL-MCNC: 0.86 MG/DL (ref 0.57–1)
EGFRCR SERPLBLD CKD-EPI 2021: 98.7 ML/MIN/1.73
ERYTHROCYTE [DISTWIDTH] IN BLOOD BY AUTOMATED COUNT: 11.7 % (ref 12.3–15.4)
GLOBULIN SER CALC-MCNC: 2.9 GM/DL
GLUCOSE SERPL-MCNC: 92 MG/DL (ref 65–99)
HCT VFR BLD AUTO: 38.1 % (ref 34–46.6)
HDLC SERPL-MCNC: 53 MG/DL (ref 40–60)
HGB BLD-MCNC: 13.3 G/DL (ref 12–15.9)
LDLC SERPL CALC-MCNC: 71 MG/DL (ref 0–100)
MCH RBC QN AUTO: 29.6 PG (ref 26.6–33)
MCHC RBC AUTO-ENTMCNC: 34.9 G/DL (ref 31.5–35.7)
MCV RBC AUTO: 84.9 FL (ref 79–97)
PLATELET # BLD AUTO: 291 10*3/MM3 (ref 140–450)
POTASSIUM SERPL-SCNC: 4.7 MMOL/L (ref 3.5–5.2)
PROT SERPL-MCNC: 7 G/DL (ref 6–8.5)
RBC # BLD AUTO: 4.49 10*6/MM3 (ref 3.77–5.28)
SODIUM SERPL-SCNC: 140 MMOL/L (ref 136–145)
TRIGL SERPL-MCNC: 64 MG/DL (ref 0–150)
TSH SERPL DL<=0.005 MIU/L-ACNC: 1.78 UIU/ML (ref 0.27–4.2)
VLDLC SERPL CALC-MCNC: 13 MG/DL (ref 5–40)
WBC # BLD AUTO: 7.02 10*3/MM3 (ref 3.4–10.8)

## 2024-12-18 RX ORDER — DESVENLAFAXINE 25 MG/1
25 TABLET, EXTENDED RELEASE ORAL DAILY
Qty: 90 TABLET | Refills: 1 | Status: SHIPPED | OUTPATIENT
Start: 2025-04-21

## 2024-12-19 LAB — REF LAB TEST METHOD: NORMAL

## 2024-12-19 NOTE — PROGRESS NOTES
Labs stable other than vitamin D remains low so I would recommend 5000 international units of vitamin D3 over-the-counter daily and try to get 15 minutes of sunlight daily  Pap smear is normal with negative HPV.  Is recommend to repeat this in 3 years.

## 2025-01-22 ENCOUNTER — OFFICE VISIT (OUTPATIENT)
Dept: INTERNAL MEDICINE | Facility: CLINIC | Age: 20
End: 2025-01-22
Payer: COMMERCIAL

## 2025-01-22 VITALS
RESPIRATION RATE: 14 BRPM | HEIGHT: 62 IN | BODY MASS INDEX: 24.11 KG/M2 | TEMPERATURE: 98.5 F | SYSTOLIC BLOOD PRESSURE: 111 MMHG | DIASTOLIC BLOOD PRESSURE: 58 MMHG | WEIGHT: 131 LBS | HEART RATE: 85 BPM | OXYGEN SATURATION: 99 %

## 2025-01-22 DIAGNOSIS — R30.0 DYSURIA: ICD-10-CM

## 2025-01-22 DIAGNOSIS — N30.01 ACUTE CYSTITIS WITH HEMATURIA: Primary | ICD-10-CM

## 2025-01-22 LAB
BILIRUB BLD-MCNC: NEGATIVE MG/DL
CLARITY, POC: ABNORMAL
COLOR UR: ABNORMAL
EXPIRATION DATE: ABNORMAL
GLUCOSE UR STRIP-MCNC: NEGATIVE MG/DL
KETONES UR QL: NEGATIVE
LEUKOCYTE EST, POC: ABNORMAL
Lab: ABNORMAL
NITRITE UR-MCNC: NEGATIVE MG/ML
PH UR: 7.5 [PH] (ref 5–8)
PROT UR STRIP-MCNC: NEGATIVE MG/DL
RBC # UR STRIP: ABNORMAL /UL
SP GR UR: 1.01 (ref 1–1.03)
UROBILINOGEN UR QL: NORMAL

## 2025-01-22 PROCEDURE — 81003 URINALYSIS AUTO W/O SCOPE: CPT | Performed by: INTERNAL MEDICINE

## 2025-01-22 PROCEDURE — 99213 OFFICE O/P EST LOW 20 MIN: CPT | Performed by: INTERNAL MEDICINE

## 2025-01-22 RX ORDER — NITROFURANTOIN 25; 75 MG/1; MG/1
100 CAPSULE ORAL 2 TIMES DAILY
Qty: 10 CAPSULE | Refills: 0 | Status: SHIPPED | OUTPATIENT
Start: 2025-01-22 | End: 2025-01-23

## 2025-01-22 NOTE — PROGRESS NOTES
Chief Complaint   Patient presents with    Difficulty Urinating     Burning this am       Subjective     History of Present Illness  The patient is a 19-year-old female presenting for concerns of a urinary tract infection.    She reports experiencing dysuria during her last two urination episodes at work, despite not having any issues with her first morning urination. She also mentions a recent episode of back discomfort a few days ago but has no history of nephrolithiasis. She acknowledges inadequate hydration habits and is making efforts to improve her water intake. Her menstrual cycle concluded approximately one week ago. She recalls a similar episode a few months prior, which was unusual for her. A urine culture from September 2024 showed no significant growth, and she was treated with nitrofurantoin, which proved effective.        The following portions of the patient's history were reviewed and updated as appropriate: allergies, current medications, past family history, past medical history, past social history, past surgical history and problem list.    Review of Systems   Constitutional:  Negative for chills, fatigue and fever.   HENT:  Negative for congestion, ear pain, rhinorrhea, sinus pressure and sore throat.    Eyes:  Negative for visual disturbance.   Respiratory:  Negative for cough, chest tightness, shortness of breath and wheezing.    Cardiovascular:  Negative for chest pain, palpitations and leg swelling.   Gastrointestinal:  Negative for abdominal pain, blood in stool, constipation, diarrhea, nausea and vomiting.   Endocrine: Negative for polydipsia and polyuria.   Genitourinary:  Negative for dysuria and hematuria.   Musculoskeletal:  Negative for arthralgias and back pain.   Skin:  Negative for rash.   Neurological:  Negative for dizziness, light-headedness, numbness and headaches.   Psychiatric/Behavioral:  Negative for dysphoric mood and sleep disturbance. The patient is not nervous/anxious.         No Known Allergies    Past Medical History:   Diagnosis Date    Anxiety and depression     Arrhythmia     I feel like i have heart palps/flutters    Arthritis     Asthma     as a child    GERD (gastroesophageal reflux disease)     Headache     Heart murmur     I had one when i was younger.    HL (hearing loss)     Hypoglycemia     Meningitis 2015    Peptic ulceration May 2023    Sickle cell anemia     Sickle cell trait     Sprain     left ankle    Urinary tract infection        Social History     Socioeconomic History    Marital status: Single   Tobacco Use    Smoking status: Never     Passive exposure: Never    Smokeless tobacco: Never   Vaping Use    Vaping status: Never Used   Substance and Sexual Activity    Alcohol use: Yes     Alcohol/week: 2.0 standard drinks of alcohol     Types: 2 Drinks containing 0.5 oz of alcohol per week    Drug use: No    Sexual activity: Yes     Partners: Male     Birth control/protection: Birth control pill        Past Surgical History:   Procedure Laterality Date    BURSECTOMY Right 04/27/2017    Procedure: OPEN PREPATELLAR BURSECTOMY AND REPAIR RIGHT;  Surgeon: Jevon Braga MD;  Location: [facility] OR;  Service:     EAR TUBES      ENDOSCOPY      at age 6    ENDOSCOPY N/A 3/1/2024    Procedure: ESOPHAGOGASTRODUODENOSCOPY WITH BIOPSY;  Surgeon: Paddy Horn MD;  Location: [facility] ENDOSCOPY;  Service: Gastroenterology;  Laterality: N/A;    OTHER SURGICAL HISTORY  2015    SPINAL TAP    TYMPANOPLASTY Right     X2       Family History   Problem Relation Age of Onset    Anxiety disorder Mother     Asthma Mother     Depression Mother     Drug abuse Father     Birth defects Brother         2 heart valves instead of 3    Arthritis Maternal Grandmother     Scoliosis Maternal Grandfather     Osteoarthritis Other     Osteoporosis Other     Asthma Other     COPD Other     Abnormal EKG Other     Colon cancer Neg Hx          Current Outpatient Medications:     [START ON  "4/21/2025] Desvenlafaxine Succinate ER 25 MG tablet sustained-release 24 hour, Take 1 tablet by mouth Daily., Disp: 90 tablet, Rfl: 1    norethindrone (MICRONOR) 0.35 MG tablet, Take 1 tablet by mouth Daily., Disp: 84 tablet, Rfl: 3    nitrofurantoin, macrocrystal-monohydrate, (Macrobid) 100 MG capsule, Take 1 capsule by mouth 2 (Two) Times a Day., Disp: 10 capsule, Rfl: 0    Objective   /58   Pulse 85   Temp 98.5 °F (36.9 °C) (Temporal)   Resp 14   Ht 157.5 cm (62\")   Wt 59.4 kg (131 lb)   SpO2 99%   BMI 23.96 kg/m²     Physical Exam  Vitals and nursing note reviewed.   Constitutional:       Appearance: Normal appearance. She is well-developed.   HENT:      Head: Normocephalic and atraumatic.   Eyes:      Extraocular Movements: Extraocular movements intact.      Conjunctiva/sclera: Conjunctivae normal.   Pulmonary:      Effort: Pulmonary effort is normal.   Musculoskeletal:      Cervical back: Normal range of motion and neck supple.   Skin:     General: Skin is warm and dry.      Findings: No rash.   Neurological:      General: No focal deficit present.      Mental Status: She is alert and oriented to person, place, and time.   Psychiatric:         Mood and Affect: Mood normal.         Behavior: Behavior normal.         Results:  Results for orders placed or performed in visit on 01/22/25   POCT urinalysis dipstick, automated    Collection Time: 01/22/25  5:31 PM    Specimen: Urine   Result Value Ref Range    Color Straw Yellow, Straw, Dark Yellow, Briseyda    Clarity, UA Hazy (A) Clear    Specific Gravity  1.010 1.005 - 1.030    pH, Urine 7.5 5.0 - 8.0    Leukocytes Large (3+) (A) Negative    Nitrite, UA Negative Negative    Protein, POC Negative Negative mg/dL    Glucose, UA Negative Negative mg/dL    Ketones, UA Negative Negative    Urobilinogen, UA Normal Normal, 0.2 E.U./dL    Bilirubin Negative Negative    Blood, UA Trace (A) Negative    Lot Number [id number]     Expiration Date 05/01/2026  "         Assessment & Plan   Diagnoses and all orders for this visit:    1. Acute cystitis with hematuria (Primary)  -     nitrofurantoin, macrocrystal-monohydrate, (Macrobid) 100 MG capsule; Take 1 capsule by mouth 2 (Two) Times a Day.  Dispense: 10 capsule; Refill: 0    2. Dysuria  -     POCT urinalysis dipstick, automated  -     Cancel: Urine Culture - Urine, Urine, Clean Catch; Future  -     Urine Culture - Urine, Urine, Clean Catch        Assessment & Plan   Urinary Tract Infection.  The presence of leukocytes in the urine suggests an ongoing inflammatory process.  Urine cultures to be obtained for further evaluation.  Start Macrobid 100 mg p.o. twice daily x 5 days.    Follow up:  Return if symptoms worsen or fail to improve.     Patient or patient representative verbalized consent for the use of Ambient Listening during the visit with  Adilson Posada DO for chart documentation. 1/22/2025  17:45 EST

## 2025-01-23 ENCOUNTER — TELEPHONE (OUTPATIENT)
Dept: INTERNAL MEDICINE | Facility: CLINIC | Age: 20
End: 2025-01-23
Payer: COMMERCIAL

## 2025-01-23 DIAGNOSIS — N30.01 ACUTE CYSTITIS WITH HEMATURIA: ICD-10-CM

## 2025-01-23 RX ORDER — NITROFURANTOIN 25; 75 MG/1; MG/1
100 CAPSULE ORAL 2 TIMES DAILY
Qty: 10 CAPSULE | Refills: 0 | Status: SHIPPED | OUTPATIENT
Start: 2025-01-23 | End: 2025-01-23 | Stop reason: SDUPTHER

## 2025-01-23 RX ORDER — NITROFURANTOIN 25; 75 MG/1; MG/1
100 CAPSULE ORAL 2 TIMES DAILY
Qty: 10 CAPSULE | Refills: 0 | Status: SHIPPED | OUTPATIENT
Start: 2025-01-23 | End: 2025-01-24 | Stop reason: SDUPTHER

## 2025-01-23 NOTE — TELEPHONE ENCOUNTER
Caller: Antonella Arellano    Relationship to patient: Self    Best call back number: [phone]     PATIENT IS CALLING TO STATE THAT [facility] DID NOT GET THE MACROBID PRESCRIPTION.  CAN THIS BE SENT AGAIN?

## 2025-01-24 DIAGNOSIS — N30.01 ACUTE CYSTITIS WITH HEMATURIA: ICD-10-CM

## 2025-01-24 RX ORDER — NITROFURANTOIN 25; 75 MG/1; MG/1
100 CAPSULE ORAL 2 TIMES DAILY
Qty: 10 CAPSULE | Refills: 0 | Status: SHIPPED | OUTPATIENT
Start: 2025-01-24

## 2025-01-26 LAB
BACTERIA UR CULT: ABNORMAL
BACTERIA UR CULT: ABNORMAL
OTHER ANTIBIOTIC SUSC ISLT: ABNORMAL

## 2025-01-27 NOTE — PROGRESS NOTES
Staphylococcus bacteria was noted in urine.  The Macrobid prescribed should be sufficient to treat. Hopefully symptoms are better by now.

## 2025-03-07 ENCOUNTER — PATIENT ROUNDING (BHMG ONLY) (OUTPATIENT)
Dept: URGENT CARE | Facility: CLINIC | Age: 20
End: 2025-03-07
Payer: COMMERCIAL

## 2025-03-10 ENCOUNTER — OFFICE VISIT (OUTPATIENT)
Dept: INTERNAL MEDICINE | Facility: CLINIC | Age: 20
End: 2025-03-10
Payer: COMMERCIAL

## 2025-03-10 VITALS
HEART RATE: 83 BPM | TEMPERATURE: 98.8 F | BODY MASS INDEX: 25.58 KG/M2 | OXYGEN SATURATION: 98 % | SYSTOLIC BLOOD PRESSURE: 112 MMHG | HEIGHT: 62 IN | WEIGHT: 139 LBS | DIASTOLIC BLOOD PRESSURE: 60 MMHG

## 2025-03-10 DIAGNOSIS — N39.0 RECURRENT UTI: ICD-10-CM

## 2025-03-10 DIAGNOSIS — L29.9 ITCHING: Primary | ICD-10-CM

## 2025-03-10 PROCEDURE — 99214 OFFICE O/P EST MOD 30 MIN: CPT | Performed by: NURSE PRACTITIONER

## 2025-03-10 NOTE — PROGRESS NOTES
Office Visit      Date: 03/10/2025   Patient Name: Antonella Arellano  : 2005   MRN: [id number]     Chief Complaint:    Chief Complaint   Patient presents with    Itching     Bilateral thighs and back of knees, mostly at night    Urinary Tract Infection     Having frequent UTIs 3 in the last 4 months, currently taking abx for one right now       History of Present Illness: Antonella Arellano is a 19 y.o. female presents for itching on bilateral thighs and back of knees.  This was occurring for about a month, mostly at night.  No rash. She thought it was detergent because she is sensitive to detergent but she has not changed anything in her daily regimen.  It was going on when she made the appointment but it has resolved over the past week.  She did have bruising on her legs from the itching but those have also resolved.  History of recurrent UTIs, patient states she has had 3 UTIs in the past 4 months.  She is currently taking Bactrim, urine culture came back with mixed faviola.  She did notice improvement in her symptoms on the Bactrim so she has continued to take it.  She was not drinking much water but she is trying to now.  She voids after intercourse.  She does hold her urine at work sometimes.  Does not take any baths. No fever, myalgia, chills. She had 3+ blood in urine, but started menstrual cycle that next day. Has not notice any hematuria.     Subjective      Review of Systems:   Pertinent ROS noted in HPI.     I have reviewed the patients family history, social history, past medical history, past surgical history and have updated it as appropriate.     Medications:     Current Outpatient Medications:     [START ON 2025] Desvenlafaxine Succinate ER 25 MG tablet sustained-release 24 hour, Take 1 tablet by mouth Daily., Disp: 90 tablet, Rfl: 1    norethindrone (MICRONOR) 0.35 MG tablet, Take 1 tablet by mouth Daily., Disp: 84 tablet, Rfl: 3    sulfamethoxazole-trimethoprim (BACTRIM  "DS,SEPTRA DS) 800-160 MG per tablet, Take 1 tablet by mouth 2 (Two) Times a Day for 10 days., Disp: 20 tablet, Rfl: 0    Allergies:   No Known Allergies    Objective     Physical Exam  Vitals and nursing note reviewed.   Constitutional:       General: She is not in acute distress.     Appearance: Normal appearance.   HENT:      Head: Normocephalic and atraumatic.   Eyes:      Extraocular Movements: Extraocular movements intact.   Cardiovascular:      Rate and Rhythm: Normal rate.   Pulmonary:      Effort: Pulmonary effort is normal.   Abdominal:      Tenderness: There is no abdominal tenderness.   Musculoskeletal:         General: Normal range of motion.      Cervical back: Normal range of motion.   Neurological:      Mental Status: She is alert and oriented to person, place, and time.   Psychiatric:         Mood and Affect: Mood normal.         Vital Signs:   Vitals:    03/10/25 1708   BP: 112/60   Pulse: 83   Temp: 98.8 °F (37.1 °C)   SpO2: 98%   Weight: 63 kg (139 lb)   Height: 157.5 cm (62\")     Body mass index is 25.42 kg/m².      Labs:   Admission on 03/07/2025, Discharged on 03/07/2025   Component Date Value Ref Range Status    Color 03/07/2025 Straw   Final    Clarity, UA 03/07/2025 Turbid (A)   Final    Glucose, UA 03/07/2025 Negative  mg/dL Final    Bilirubin 03/07/2025 Negative   Final    Ketones, UA 03/07/2025 Negative   Final    Specific Gravity  03/07/2025 1.020  1.005 - 1.030 Final    Blood, UA 03/07/2025 3+ (A)   Final    pH, Urine 03/07/2025 6.0  5.0 - 8.0 Final    Protein, POC 03/07/2025 Negative  mg/dL Final    Urobilinogen, UA 03/07/2025 0.2 E.U./dL   Final    Nitrite, UA 03/07/2025 Negative   Final    Leukocytes 03/07/2025 Large (3+) (A)   Final    Urine Culture 03/07/2025 Final report   Final    Result 1 03/07/2025 Comment   Final    Mixed urogenital faviola  Less than 10,000 colonies/mL        Assessment / Plan      Assessment/Plan:   Problem List Items Addressed This Visit    None  Visit " Diagnoses         Itching    -  Primary      Recurrent UTI        Relevant Orders    POC Urinalysis Dipstick, Automated    Genital Mycoplasma KATHY Urine - Urine, Clean Catch    Urine Culture - Urine, Urine, Clean Catch            Itching has resolved.  This sounds like it was a histamine response. If this recurs I would recommend using Free and clear laundry detergent as well as try to avoid scented lotions, soaps.  Would recommend Xyzal at bedtime if itching recurs.  If chronic problem would recommend dermatology patch testing.  Patient has a history of recurrent UTIs.  She is currently on Bactrim, urine culture with mixed faviola.  Recommend rechecking urine in a couple weeks, there was 3+ blood but she started her menstrual cycle the next day after collection.  Will do genital mycoplasma testing as have seen patients present with UTI symptoms have this underlying infection.  Ensure to void after intercourse, push water, avoid caffeinated and carbonated beverages, wipe front to back, avoid holding urine, avoid bubble baths.  Supplements that can help prevent UTIs include d-mannose. Discussed cranberry supplement vs juice and myths. Will refer to urology if persists.     Follow Up:   CRISTINA NUNEZ  [facility] [place]

## 2025-03-28 DIAGNOSIS — N39.0 RECURRENT UTI: ICD-10-CM

## 2025-03-28 DIAGNOSIS — R30.0 DYSURIA: ICD-10-CM

## 2025-03-28 DIAGNOSIS — N39.0 RECURRENT UTI: Primary | ICD-10-CM

## 2025-05-29 ENCOUNTER — OFFICE VISIT (OUTPATIENT)
Dept: PSYCHIATRY | Facility: CLINIC | Age: 20
End: 2025-05-29
Payer: COMMERCIAL

## 2025-05-29 VITALS
OXYGEN SATURATION: 99 % | HEIGHT: 62 IN | WEIGHT: 138 LBS | SYSTOLIC BLOOD PRESSURE: 96 MMHG | HEART RATE: 72 BPM | BODY MASS INDEX: 25.4 KG/M2 | DIASTOLIC BLOOD PRESSURE: 68 MMHG

## 2025-05-29 DIAGNOSIS — Z79.899 MEDICATION MANAGEMENT: ICD-10-CM

## 2025-05-29 DIAGNOSIS — F41.9 ANXIETY: ICD-10-CM

## 2025-05-29 DIAGNOSIS — F90.0 ADHD (ATTENTION DEFICIT HYPERACTIVITY DISORDER), INATTENTIVE TYPE: Primary | ICD-10-CM

## 2025-05-29 RX ORDER — DEXTROAMPHETAMINE SACCHARATE, AMPHETAMINE ASPARTATE MONOHYDRATE, DEXTROAMPHETAMINE SULFATE AND AMPHETAMINE SULFATE 2.5; 2.5; 2.5; 2.5 MG/1; MG/1; MG/1; MG/1
10 CAPSULE, EXTENDED RELEASE ORAL DAILY
Qty: 30 CAPSULE | Refills: 0 | Status: SHIPPED | OUTPATIENT
Start: 2025-05-29

## 2025-05-29 NOTE — PROGRESS NOTES
Subjective   Antonella Arellano is a 19 y.o. female who presents today for initial evaluation     Chief Complaint:  poor concentration    History of Present Illness:   History of Present Illness  Antonella Arellano presents to [facility] for initial evaluation. She is currently taking Pristiq 25 mg daily that has been managed by PCP, ENRIQUE Sampson. Previously diagnosed with anxiety and depression, began medication for symptoms around age 16.  Voices that she has been struggling more with focus, concentration and forgetfulness. Currently taking classes at [employer] and has noticed that more effort is needed to complete assignments. Forgetful in daily activities as well as appointments and plans. Says that she is also easily distracted, this often results in taking longer to complete tasks and has trouble sitting down to watch TV. Denies any bothersome symptoms of depression and feels symptoms are adequately controlled. Still becomes anxious on occasion that is more situational, but able to manage symptoms well. Has noticed that she is more particular about some things like laundry, but not others things. Did well throughout school academically and was involved in sports and activities, saying that she was often on the go and busy with extracurricular activities. Reports that sleep is good, typically obtains about 8 hours per night. Denies issues initiating or maintaining sleep. Voices that appetite is typically good, normally eats smaller portions more often. Denies SI/HI. PHQ-9 total Scor: 6, SARAH-7 total score: 2.    Past Psychiatric History: Diagnosed with anxiety and depression around age 16 and started medication at that time. Medications have been managed by PCP. Engaged in therapy for a short time during childhood. Denies any past psychiatric hospitalizations. Denies any past suicide attempts, self-harming behaviors, SI/HI.     Previous Psych Meds: Lexapro (ineffective),  Pristiq (x 6 months now and has been effective)     Substance Use/Abuse: Denies     Past Medical  History: Multiple UTIs and has been referred to urology     Family Psychiatric History: Maternal grandfather with anxiety and depression, mother with history of anxiety and depression along with recent diagnosis of ADHD     Social History: Lives in [place] with ,  a couple weeks ago. 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     Legal History: Denies     The following portions of the patient's history were reviewed and updated as appropriate: allergies, current medications, past family history, past medical history, past social history, past surgical history and problem list.    Past Medical History:   Diagnosis Date    Ankle sprain     Anxiety and depression     Arrhythmia     I feel like i have heart palps/flutters    Arthritis     Asthma     as a child    Fracture of wrist     GERD (gastroesophageal reflux disease)     Headache     Heart murmur     I had one when i was younger.    HL (hearing loss)     Hypoglycemia     Knee swelling     Meningitis 2015    Peptic ulceration May 2023    Rotator cuff syndrome     Sickle cell anemia     Sickle cell trait     Sprain     left ankle    Urinary tract infection      Social History     Socioeconomic History    Marital status:    Tobacco Use    Smoking status: Never     Passive exposure: Never    Smokeless tobacco: Never   Vaping Use    Vaping status: Never Used   Substance and Sexual Activity    Alcohol use: Yes     Alcohol/week: 2.0 standard drinks of alcohol     Types: 2 Drinks containing 0.5 oz of alcohol per week    Drug use: Never    Sexual activity: Yes     Partners: Male     Birth control/protection: Birth control pill     Family History   Problem Relation Age of Onset    Anxiety disorder Mother     Asthma Mother     Depression Mother     ADD / ADHD Mother     Seizures Mother      Drug abuse Father     Birth defects Brother         2 heart valves instead of 3    Arthritis Maternal Grandmother     Rheumatologic disease Maternal Grandmother     Scoliosis Maternal Grandfather     Osteoarthritis Other     Osteoporosis Other     Asthma Other     COPD Other     Abnormal EKG Other     Learning disabilities Brother         Dyslexia    Colon cancer Neg Hx      Past Surgical History:   Procedure Laterality Date    BURSECTOMY Right 04/27/2017    Procedure: OPEN PREPATELLAR BURSECTOMY AND REPAIR RIGHT;  Surgeon: Jevon Braga MD;  Location: [facility] OR;  Service:     EAR TUBES      ENDOSCOPY      at age 6    ENDOSCOPY N/A 03/01/2024    Procedure: ESOPHAGOGASTRODUODENOSCOPY WITH BIOPSY;  Surgeon: Paddy Horn MD;  Location: [facility] ENDOSCOPY;  Service: Gastroenterology;  Laterality: N/A;    KNEE SURGERY      OTHER SURGICAL HISTORY  2015    SPINAL TAP    TYMPANOPLASTY Right     X2    UPPER GASTROINTESTINAL ENDOSCOPY      I am not sure of the exact date. I was 6 years old i belive     Patient Active Problem List   Diagnosis    Epigastric pain    Nausea    Palpitations    Delayed gastric emptying    Functional gastrointestinal disorder    Irritable bowel syndrome with diarrhea     Allergy:   No Known Allergies     Current Outpatient Medications   Medication Sig Dispense Refill    D-Mannose 350 MG capsule Take 1 capsule by mouth 3 (Three) Times a Day.      amphetamine-dextroamphetamine XR (Adderall XR) 10 MG 24 hr capsule Take 1 capsule by mouth Daily 30 capsule 0    Desvenlafaxine Succinate ER 25 MG tablet sustained-release 24 hour Take 1 tablet by mouth Daily. 90 tablet 1    nitrofurantoin, macrocrystal-monohydrate, (Macrobid) 100 MG capsule Take 1 capsule by mouth 2 (Two) Times a Day. 10 capsule 0    norethindrone (MICRONOR) 0.35 MG tablet Take 1 tablet by mouth Daily. 84 tablet 3     No current facility-administered medications for this visit.     Review of Systems   Constitutional:  Negative  "for activity change, appetite change, unexpected weight gain and unexpected weight loss.   Respiratory:  Negative for shortness of breath.    Cardiovascular:  Negative for chest pain.   Psychiatric/Behavioral:  Positive for decreased concentration. Negative for sleep disturbance and suicidal ideas. The patient is nervous/anxious.      Physical Exam  Vitals reviewed.   Constitutional:       General: She is not in acute distress.     Appearance: Normal appearance.   Neurological:      Mental Status: She is alert.      Gait: Gait normal.       Vitals:   Blood pressure 96/68, pulse 72, height 157.5 cm (62\"), weight 62.6 kg (138 lb), SpO2 99%.    Mental Status Exam:   Hygiene:   good  Cooperation:  Cooperative  Eye Contact:  Good  Psychomotor Behavior:  Appropriate  Affect:  Full range and Appropriate  Mood: normal  Hopelessness: Denies  Speech:  Normal  Thought Process:  Goal directed and Linear  Thought Content:  Mood congruent  Suicidal:  None  Homicidal:  None  Hallucinations:  None  Delusion:  None  Memory:  Intact  Orientation:  Person, Place, Time, and Situation  Reliability:  good  Insight:  Good  Judgement:  Good  Impulse Control:  Good    Assessment & Plan   Problems Addressed this Visit    None  Visit Diagnoses         ADHD (attention deficit hyperactivity disorder), inattentive type    -  Primary    Relevant Medications    amphetamine-dextroamphetamine XR (Adderall XR) 10 MG 24 hr capsule    Other Relevant Orders    Compliance Drug Analysis, Ur - Urine, Clean Catch (Completed)      Medication management        Relevant Orders    Compliance Drug Analysis, Ur - Urine, Clean Catch (Completed)      Anxiety              Diagnoses         Codes Comments      ADHD (attention deficit hyperactivity disorder), inattentive type    -  Primary ICD-10-CM: F90.0  ICD-9-CM: 314.00       Medication management     ICD-10-CM: Z79.899  ICD-9-CM: V58.69       Anxiety     ICD-10-CM: F41.9  ICD-9-CM: 300.00           Visit " Diagnoses:    ICD-10-CM ICD-9-CM   1. ADHD (attention deficit hyperactivity disorder), inattentive type  F90.0 314.00   2. Medication management  Z79.899 V58.69   3. Anxiety  F41.9 300.00     Today's visit is initial evaluation for possible ADHD. History of depression and anxiety with symptoms that are managed with medication. Struggling with symptoms that align with ADHD diagnosis based on DSM-V criteria. ASRS positive with 4/6 in shaded area of Part A and 8/12 in shaded area of Part B.   -CPT 3 completed on 5/2/2025 with a total of 3 atypical T-scores which is associated with a moderate likelihood of having a disorder characterized by attention deficits, such as ADHD. Her profile of scores and response pattern indicates that she may have issues with impulsivity (strong indication) and sustained attention (some indication).  Discussed symptoms and negative impact on daily life in work, school and home. Medication options discussed including nonstimulant and stimulant options. We will initiate Adderall XR 10 mg daily for ADHD symptoms. Discussed requirements for obtaining controlled substance medication to include UDS and CSA.  -UDS obtained today  -Start Adderall XR 10 mg daily  -Reviewed previous available documentation and most recent available labs.   ALEX reviewed and is appropriate. Counseled on use of controlled substances.    GOALS:  Short Term Goals: Patient will be compliant with medication, and patient will have no significant medication related side effects.  Patient will be engaged in psychotherapy as indicated.  Patient will report subjective improvement of symptoms.  Long term goals: To stabilize mood and treat/improve subjective symptoms, the patient will stay out of the hospital, the patient will be at an optimal level of functioning, and the patient will take all medications as prescribed.  The patient/guardian verbalized understanding and agreement with goals that were mutually set.    TREATMENT  PLAN: Continue supportive psychotherapy efforts and medications as indicated for patient's diagnosis.  Pharmacological and Non-Pharmacological treatment options discussed during today's visit. Patient/Guardian acknowledged and verbally consented with current treatment plan and was educated on the importance of compliance with treatment and follow-up appointments.      MEDICATION ISSUES:  Discussed medication options and treatment plan of prescribed medication as well as the risks, benefits, any black box warnings, and side effects including potential falls, possible impaired driving, and metabolic adversities among others. Patient is agreeable to call the office with any worsening of symptoms or onset of side effects, or if any concerns or questions arise.  The contact information for the office is made available to the patient. Patient is agreeable to call 911 or go to the nearest ER should they begin having any SI/HI, or if any urgent concerns arise. No medication side effects or related complaints today.     MEDS ORDERED DURING VISIT:  New Medications Ordered This Visit   Medications    amphetamine-dextroamphetamine XR (Adderall XR) 10 MG 24 hr capsule     Sig: Take 1 capsule by mouth Daily     Dispense:  30 capsule     Refill:  0     FOLLOW UP:  Return in about 4 weeks (around 6/26/2025) for Recheck.             This document has been electronically signed by ENRIQUE Elizabeth  June 12, 2025 23:44 EDT    Please note that portions of this note were completed with a voice recognition program. Efforts were made to edit dictation, but occasionally words are mistranscribed.

## 2025-05-30 ENCOUNTER — OFFICE VISIT (OUTPATIENT)
Dept: INTERNAL MEDICINE | Facility: CLINIC | Age: 20
End: 2025-05-30
Payer: COMMERCIAL

## 2025-05-30 VITALS
DIASTOLIC BLOOD PRESSURE: 62 MMHG | WEIGHT: 138 LBS | HEART RATE: 74 BPM | OXYGEN SATURATION: 99 % | SYSTOLIC BLOOD PRESSURE: 105 MMHG | HEIGHT: 62 IN | RESPIRATION RATE: 18 BRPM | BODY MASS INDEX: 25.4 KG/M2 | TEMPERATURE: 98.4 F

## 2025-05-30 DIAGNOSIS — R30.0 DYSURIA: ICD-10-CM

## 2025-05-30 DIAGNOSIS — N30.00 ACUTE CYSTITIS WITHOUT HEMATURIA: Primary | ICD-10-CM

## 2025-05-30 LAB
BILIRUB BLD-MCNC: NEGATIVE MG/DL
CLARITY, POC: CLEAR
COLOR UR: YELLOW
EXPIRATION DATE: ABNORMAL
GLUCOSE UR STRIP-MCNC: NEGATIVE MG/DL
KETONES UR QL: NEGATIVE
LEUKOCYTE EST, POC: ABNORMAL
Lab: ABNORMAL
NITRITE UR-MCNC: NEGATIVE MG/ML
PH UR: 6 [PH] (ref 5–8)
PROT UR STRIP-MCNC: NEGATIVE MG/DL
RBC # UR STRIP: NEGATIVE /UL
SP GR UR: 1.1 (ref 1–1.03)
UROBILINOGEN UR QL: NORMAL

## 2025-05-30 PROCEDURE — 99214 OFFICE O/P EST MOD 30 MIN: CPT | Performed by: INTERNAL MEDICINE

## 2025-05-30 PROCEDURE — 81003 URINALYSIS AUTO W/O SCOPE: CPT | Performed by: INTERNAL MEDICINE

## 2025-05-30 RX ORDER — NITROFURANTOIN 25; 75 MG/1; MG/1
100 CAPSULE ORAL 2 TIMES DAILY
Qty: 10 CAPSULE | Refills: 0 | Status: SHIPPED | OUTPATIENT
Start: 2025-05-30

## 2025-05-30 NOTE — PROGRESS NOTES
Chief Complaint   Patient presents with    Difficulty Urinating     Painful urination x3 days       Subjective     History of Present Illness  The patient is a 19-year-old female presenting for concerns of urinary tract infection.    She has been experiencing recurrent urinary tract infections, with the most recent episode occurring in 03/2025. The frequency of these infections is not associated with any specific time of the month. She reports dysuria, particularly at the urethral opening. She is sexually active but maintains good hygiene practices such as urinating post-coitus. She reports no concerns regarding sexually transmitted diseases and confirms a monogamous relationship. During her last visit, she was prescribed a D-mannose supplement, which she took three times daily with significant improvement. However, she did not adhere to this regimen during a recent vacation due to inadequate water intake and forgetting her vitamins at home. She was previously treated with Macrobid in 01/2025, which proved effective. Additionally, she sought care at an urgent care facility where she received another medication that successfully resolved her symptoms. She has made dietary modifications, including reducing her juice intake and increasing her water consumption.    The following portions of the patient's history were reviewed and updated as appropriate: allergies, current medications, past family history, past medical history, past social history, past surgical history and problem list.    Review of Systems   Constitutional:  Negative for chills, fatigue and fever.   HENT:  Negative for congestion, ear pain, rhinorrhea, sinus pressure and sore throat.    Eyes:  Negative for visual disturbance.   Respiratory:  Negative for cough, chest tightness, shortness of breath and wheezing.    Cardiovascular:  Negative for chest pain, palpitations and leg swelling.   Gastrointestinal:  Negative for abdominal pain, blood in stool,  constipation, diarrhea, nausea and vomiting.   Endocrine: Negative for polydipsia and polyuria.   Genitourinary:  Negative for dysuria and hematuria.   Musculoskeletal:  Negative for arthralgias and back pain.   Skin:  Negative for rash.   Neurological:  Negative for dizziness, light-headedness, numbness and headaches.   Psychiatric/Behavioral:  Negative for dysphoric mood and sleep disturbance. The patient is not nervous/anxious.        No Known Allergies    Past Medical History:   Diagnosis Date    Ankle sprain     Anxiety and depression     Arrhythmia     I feel like i have heart palps/flutters    Arthritis     Asthma     as a child    Fracture of wrist     GERD (gastroesophageal reflux disease)     Headache     Heart murmur     I had one when i was younger.    HL (hearing loss)     Hypoglycemia     Knee swelling     Meningitis 2015    Peptic ulceration May 2023    Rotator cuff syndrome     Sickle cell anemia     Sickle cell trait     Sprain     left ankle    Urinary tract infection        Social History     Socioeconomic History    Marital status:    Tobacco Use    Smoking status: Never     Passive exposure: Never    Smokeless tobacco: Never   Vaping Use    Vaping status: Never Used   Substance and Sexual Activity    Alcohol use: Yes     Alcohol/week: 2.0 standard drinks of alcohol     Types: 2 Drinks containing 0.5 oz of alcohol per week    Drug use: Never    Sexual activity: Yes     Partners: Male     Birth control/protection: Birth control pill        Past Surgical History:   Procedure Laterality Date    BURSECTOMY Right 04/27/2017    Procedure: OPEN PREPATELLAR BURSECTOMY AND REPAIR RIGHT;  Surgeon: Jevon Braga MD;  Location: [facility] OR;  Service:     EAR TUBES      ENDOSCOPY      at age 6    ENDOSCOPY N/A 03/01/2024    Procedure: ESOPHAGOGASTRODUODENOSCOPY WITH BIOPSY;  Surgeon: Paddy Horn MD;  Location: [facility] ENDOSCOPY;  Service: Gastroenterology;  Laterality: N/A;    KNEE SURGERY  "     OTHER SURGICAL HISTORY  2015    SPINAL TAP    TYMPANOPLASTY Right     X2    UPPER GASTROINTESTINAL ENDOSCOPY      I am not sure of the exact date. I was 6 years old i belive       Family History   Problem Relation Age of Onset    Anxiety disorder Mother     Asthma Mother     Depression Mother     ADD / ADHD Mother     Seizures Mother     Drug abuse Father     Birth defects Brother         2 heart valves instead of 3    Arthritis Maternal Grandmother     Rheumatologic disease Maternal Grandmother     Scoliosis Maternal Grandfather     Osteoarthritis Other     Osteoporosis Other     Asthma Other     COPD Other     Abnormal EKG Other     Learning disabilities Brother         Dyslexia    Colon cancer Neg Hx          Current Outpatient Medications:     amphetamine-dextroamphetamine XR (Adderall XR) 10 MG 24 hr capsule, Take 1 capsule by mouth Daily, Disp: 30 capsule, Rfl: 0    D-Mannose 350 MG capsule, Take 1 capsule by mouth 3 (Three) Times a Day., Disp: , Rfl:     Desvenlafaxine Succinate ER 25 MG tablet sustained-release 24 hour, Take 1 tablet by mouth Daily., Disp: 90 tablet, Rfl: 1    norethindrone (MICRONOR) 0.35 MG tablet, Take 1 tablet by mouth Daily., Disp: 84 tablet, Rfl: 3    nitrofurantoin, macrocrystal-monohydrate, (Macrobid) 100 MG capsule, Take 1 capsule by mouth 2 (Two) Times a Day., Disp: 10 capsule, Rfl: 0    Objective   /62   Pulse 74   Temp 98.4 °F (36.9 °C) (Infrared)   Resp 18   Ht 157.5 cm (62.01\")   Wt 62.6 kg (138 lb)   SpO2 99%   BMI 25.23 kg/m²     Physical Exam  Vitals and nursing note reviewed.   Constitutional:       Appearance: Normal appearance. She is well-developed.   HENT:      Head: Normocephalic and atraumatic.   Eyes:      Extraocular Movements: Extraocular movements intact.      Conjunctiva/sclera: Conjunctivae normal.   Pulmonary:      Effort: Pulmonary effort is normal.   Musculoskeletal:      Cervical back: Normal range of motion and neck supple.   Skin:     " General: Skin is warm and dry.      Findings: No rash.   Neurological:      General: No focal deficit present.      Mental Status: She is alert and oriented to person, place, and time.   Psychiatric:         Mood and Affect: Mood normal.         Behavior: Behavior normal.         Results:  Results for orders placed or performed in visit on 05/30/25   POCT urinalysis dipstick, automated    Collection Time: 05/30/25  2:08 PM    Specimen: Urine   Result Value Ref Range    Color Yellow Yellow, Straw, Dark Yellow, Briseyda    Clarity, UA Clear Clear    Specific Gravity  1.100 (A) 1.005 - 1.030    pH, Urine 6.0 5.0 - 8.0    Leukocytes Large (3+) (A) Negative    Nitrite, UA Negative Negative    Protein, POC Negative Negative mg/dL    Glucose, UA Negative Negative mg/dL    Ketones, UA Negative Negative    Urobilinogen, UA Normal Normal, 0.2 E.U./dL    Bilirubin Negative Negative    Blood, UA Negative Negative    Lot Number [id number]     Expiration Date 10,052,026          Assessment & Plan   Diagnoses and all orders for this visit:    1. Dysuria (Primary)  -     POCT urinalysis dipstick, automated  -     Chlamydia / Gonococcus / Mycoplasma Genitalium, KATHY, Urine - Urine, Clean Catch    2. Acute cystitis without hematuria  -     nitrofurantoin, macrocrystal-monohydrate, (Macrobid) 100 MG capsule; Take 1 capsule by mouth 2 (Two) Times a Day.  Dispense: 10 capsule; Refill: 0  -     Ambulatory Referral to Urology        Assessment & Plan   Urinary Tract Infection with recurrent episodes  - Multiple episodes of UTIs over the past 6 months, with at least four documented cases.  - Previous treatments included Macrobid in January, which was effective, and another unspecified medication from urgent care.  - A urine culture will be ordered to identify any potential pathogens.  - A prescription for Macrobid will be sent to pharmacy. A referral to urology has been initiated for further evaluation and management. Advised to maintain  adequate hydration by consuming plenty of water.    Follow up:  No follow-ups on file.     Patient or patient representative verbalized consent for the use of Ambient Listening during the visit with  Adilson Posada DO for chart documentation. 5/30/2025  14:21 EDT

## 2025-06-02 ENCOUNTER — PATIENT MESSAGE (OUTPATIENT)
Dept: INTERNAL MEDICINE | Facility: CLINIC | Age: 20
End: 2025-06-02
Payer: COMMERCIAL

## 2025-06-02 DIAGNOSIS — Z30.011 ENCOUNTER FOR INITIAL PRESCRIPTION OF CONTRACEPTIVE PILLS: ICD-10-CM

## 2025-06-02 DIAGNOSIS — F41.9 ANXIETY: ICD-10-CM

## 2025-06-02 RX ORDER — ACETAMINOPHEN AND CODEINE PHOSPHATE 120; 12 MG/5ML; MG/5ML
1 SOLUTION ORAL DAILY
Qty: 84 TABLET | Refills: 3 | Status: SHIPPED | OUTPATIENT
Start: 2025-06-02

## 2025-06-02 RX ORDER — DESVENLAFAXINE 25 MG/1
25 TABLET, EXTENDED RELEASE ORAL DAILY
Qty: 90 TABLET | Refills: 1 | Status: SHIPPED | OUTPATIENT
Start: 2025-06-02

## 2025-06-03 LAB
C TRACH RRNA UR QL NAA+PROBE: NEGATIVE
M GENITALIUM DNA UR QL NAA+PROBE: NEGATIVE
N GONORRHOEA RRNA UR QL NAA+PROBE: NEGATIVE

## 2025-06-04 LAB — DRUGS UR: NORMAL

## 2025-07-02 ENCOUNTER — TELEPHONE (OUTPATIENT)
Dept: UROLOGY | Facility: CLINIC | Age: 20
End: 2025-07-02
Payer: COMMERCIAL

## 2025-07-03 ENCOUNTER — OFFICE VISIT (OUTPATIENT)
Dept: UROLOGY | Facility: CLINIC | Age: 20
End: 2025-07-03
Payer: COMMERCIAL

## 2025-07-03 VITALS
BODY MASS INDEX: 25.76 KG/M2 | WEIGHT: 140 LBS | OXYGEN SATURATION: 100 % | SYSTOLIC BLOOD PRESSURE: 118 MMHG | DIASTOLIC BLOOD PRESSURE: 72 MMHG | HEART RATE: 78 BPM | HEIGHT: 62 IN | TEMPERATURE: 98.4 F

## 2025-07-03 DIAGNOSIS — N34.2 URETHRITIS: Primary | ICD-10-CM

## 2025-07-03 DIAGNOSIS — R30.0 DYSURIA: ICD-10-CM

## 2025-07-03 LAB
BILIRUB BLD-MCNC: ABNORMAL MG/DL
CLARITY, POC: CLEAR
COLOR UR: ABNORMAL
EXPIRATION DATE: ABNORMAL
GLUCOSE UR STRIP-MCNC: NEGATIVE MG/DL
KETONES UR QL: NEGATIVE
LEUKOCYTE EST, POC: ABNORMAL
Lab: ABNORMAL
NITRITE UR-MCNC: NEGATIVE MG/ML
PH UR: 6.5 [PH] (ref 5–8)
PROT UR STRIP-MCNC: ABNORMAL MG/DL
RBC # UR STRIP: ABNORMAL /UL
SP GR UR: 1.02 (ref 1–1.03)
UROBILINOGEN UR QL: NORMAL

## 2025-07-03 RX ORDER — PHENAZOPYRIDINE HYDROCHLORIDE 100 MG/1
100 TABLET, FILM COATED ORAL 3 TIMES DAILY PRN
Qty: 20 TABLET | Refills: 0 | Status: SHIPPED | OUTPATIENT
Start: 2025-07-03

## 2025-07-03 NOTE — PROGRESS NOTES
Office Visit     Patient Name: Antonella Arellano  : 2005   MRN: [id number]     Patient or patient representative verbalized consent for the use of Ambient Listening during the visit with  ENRIQUE Dickson for chart documentation. 7/3/2025  10:52 EDT    Chief Complaint:   Chief Complaint   Patient presents with    Acute cystitis without hematuria     New patient      Referring Provider: Adilson Posada DO    Primary Care Provider: Kayleen Moore APRN     History of Present Illness  The patient presents for evaluation of urinary symptoms.    Urinary Symptoms  - Reports a burning sensation at the end of urination, primarily in the urethra  - Occasional bladder discomfort and spasms  - Over the past 6 months, has had 4 UTIs, each time seeking medical attention  - Primary care physician recommended D-mannose, which she found beneficial  - Forgot to take D-mannose during her honeymoon at the end of 2025 and developed a UTI upon her return  - After treatment and resuming D-mannose, her symptoms improved  - Experienced 2 bladder spasms during intercourse but otherwise reports no issues    Supplemental information: She consumes one cup of coffee in the morning and tries to drink water throughout the day, occasionally having lemonade or Gatorade. She acknowledges that her water intake could be improved. She is currently on her menstrual cycle and is not taking Macrobid. She was previously prescribed Pyridium, which she found helpful.      Subjective   Review of System:   As noted in HPI.    Past Medical History:   Diagnosis Date    Ankle sprain     Anxiety and depression     Arrhythmia     I feel like i have heart palps/flutters    Arthritis     Asthma     as a child    Fracture of wrist     GERD (gastroesophageal reflux disease)     Headache     Heart murmur     I had one when i was younger.    HL (hearing loss)     Hypoglycemia     Knee swelling     Meningitis 2015    Peptic ulceration  May 2023    Rotator cuff syndrome     Sickle cell anemia     Sickle cell trait     Sprain     left ankle    Urinary tract infection      Past Surgical History:   Procedure Laterality Date    BURSECTOMY Right 04/27/2017    Procedure: OPEN PREPATELLAR BURSECTOMY AND REPAIR RIGHT;  Surgeon: Jevon Braga MD;  Location: [facility] OR;  Service:     EAR TUBES      ENDOSCOPY      at age 6    ENDOSCOPY N/A 03/01/2024    Procedure: ESOPHAGOGASTRODUODENOSCOPY WITH BIOPSY;  Surgeon: Paddy Horn MD;  Location: [facility] ENDOSCOPY;  Service: Gastroenterology;  Laterality: N/A;    KNEE SURGERY      OTHER SURGICAL HISTORY  2015    SPINAL TAP    TYMPANOPLASTY Right     X2    UPPER GASTROINTESTINAL ENDOSCOPY      I am not sure of the exact date. I was 6 years old i belive     Family History   Problem Relation Age of Onset    Anxiety disorder Mother     Asthma Mother     Depression Mother     ADD / ADHD Mother     Seizures Mother     Drug abuse Father     Birth defects Brother         2 heart valves instead of 3    Arthritis Maternal Grandmother     Rheumatologic disease Maternal Grandmother     Scoliosis Maternal Grandfather     Osteoarthritis Other     Osteoporosis Other     Asthma Other     COPD Other     Abnormal EKG Other     Learning disabilities Brother         Dyslexia    Colon cancer Neg Hx      Social History     Socioeconomic History    Marital status:    Tobacco Use    Smoking status: Never     Passive exposure: Never    Smokeless tobacco: Never   Vaping Use    Vaping status: Never Used   Substance and Sexual Activity    Alcohol use: Yes     Alcohol/week: 2.0 standard drinks of alcohol     Types: 2 Drinks containing 0.5 oz of alcohol per week    Drug use: Never    Sexual activity: Yes     Partners: Male     Birth control/protection: Birth control pill       Current Outpatient Medications:     amphetamine-dextroamphetamine XR (Adderall XR) 10 MG 24 hr capsule, Take 1 capsule by mouth Daily, Disp: 30  "capsule, Rfl: 0    D-Mannose 350 MG capsule, Take 1 capsule by mouth 3 (Three) Times a Day., Disp: , Rfl:     Desvenlafaxine Succinate ER 25 MG tablet sustained-release 24 hour, Take 1 tablet by mouth Daily., Disp: 90 tablet, Rfl: 1    norethindrone (MICRONOR) 0.35 MG tablet, Take 1 tablet by mouth Daily., Disp: 84 tablet, Rfl: 3    phenazopyridine (PYRIDIUM) 100 MG tablet, Take 1 tablet by mouth 3 (Three) Times a Day As Needed for Bladder Spasms., Disp: 20 tablet, Rfl: 0    No Known Allergies  Objective   Visit Vitals  /72 (BP Location: Left arm, Patient Position: Sitting, Cuff Size: Adult)   Pulse 78   Temp 98.4 °F (36.9 °C) (Temporal)   Ht 157.5 cm (62\")   Wt 63.5 kg (140 lb)   SpO2 100%   BMI 25.61 kg/m²        Body mass index is 25.61 kg/m².     Physical Exam  Vitals and nursing note reviewed.   Constitutional:       General: She is not in acute distress.     Appearance: Normal appearance. She is not ill-appearing.   Pulmonary:      Effort: Pulmonary effort is normal. No respiratory distress.   Skin:     General: Skin is warm and dry.   Neurological:      General: No focal deficit present.      Mental Status: She is alert and oriented to person, place, and time.   Psychiatric:         Mood and Affect: Mood normal.         Behavior: Behavior normal.          Labs  Lab Results   Component Value Date    COLORU Briseyda 07/03/2025    CLARITYU Clear 07/03/2025    SPECGRAV 1.025 07/03/2025    PHUR 6.5 07/03/2025    LEUKOCYTESUR Small (1+) (A) 07/03/2025    NITRITE Negative 07/03/2025    PROTEINPOCUA 100 mg/dL (A) 07/03/2025    GLUCOSEUR Negative 07/03/2025    KETONESU Negative 07/03/2025    UROBILINOGEN Normal 07/03/2025    BILIRUBINUR Small (1+) (A) 07/03/2025    RBCUR Large (A) 07/03/2025      Lab Results   Component Value Date    WBCUA 3-5 (A) 04/17/2017    RBCUA 0-2 (A) 04/17/2017    BACTERIA Trace (A) 04/17/2017    HYALCASTU None Seen 04/17/2017    JB 3-6 (A) 04/17/2017      Urine Culture          " "1/22/2025    17:39 3/7/2025    08:55 3/25/2025    12:49   Urine Culture   Urine Culture Final report  Final report  Final report      Lab Results   Component Value Date    WBC 7.02 12/17/2024    HGB 13.3 12/17/2024    HCT 38.1 12/17/2024    MCV 84.9 12/17/2024     12/17/2024     Lab Results   Component Value Date    GLUCOSE 92 12/17/2024    CALCIUM 9.2 12/17/2024     12/17/2024    K 4.7 12/17/2024    CO2 26.7 12/17/2024     12/17/2024    BUN 10 12/17/2024    BUN 9 11/18/2023    CREATININE 0.86 12/17/2024    CREATININE 0.93 11/18/2023    EGFR 98.7 12/17/2024    EGFR 90.4 11/18/2023    BCR 11.6 12/17/2024    ANIONGAP 11.0 11/18/2023    ALT 9 12/17/2024    AST 18 12/17/2024     No results found for: \"HGBA1C\"  No results found for: \"URICACIDSTN\", \"RAEZ1EVDQXP\", \"ZGPH9MIOPD\", \"LABMAGN\"  Lab Results   Component Value Date    ZMHR71QI 25.0 (L) 12/17/2024     No results found for: \"CYSTINE\", \"URINEVOLUM\", \"CALCIUMUR\", \"OXALATEU\", \"CITRATEUR\", \"LABPH\", \"URICUR\", \"NAUR\", \"KUR\", \"MAGNESIUMUR\", \"PHOSUR\", \"AMMONIUMUR\", \"CLUR\", \"DNJLITPKJ88C\", \"UREAUR\", \"LABCREAUR\"    Lab Results   Component Value Date    CHLAMNAA Negative 05/30/2025    NGONORRHON Negative 05/30/2025       Lab Results   Component Value Date    FERRITIN 24.30 02/26/2024    TSH 1.780 12/17/2024    YFCRQUOO67 366 02/26/2024    SQDE97CA 25.0 (L) 12/17/2024         Radiographic Studies  No Images in the past 120 days found..    I have reviewed the above labs and imaging.     Assessment / Plan      Diagnoses and all orders for this visit:    1. Urethritis (Primary)  -     POC Urinalysis Dipstick, Automated    2. Dysuria  -     Mycoplasma / Ureaplasma Culture - Urine, Urine, Clean Catch  -     phenazopyridine (PYRIDIUM) 100 MG tablet; Take 1 tablet by mouth 3 (Three) Times a Day As Needed for Bladder Spasms.  Dispense: 20 tablet; Refill: 0         Assessment & Plan  1. Urethritis and Dysuria  - Urine cultures consistently negative, indicating no UTI "   - High urine concentration could cause burning sensation  - Menstruation explains blood in urine today  - Previous test for mycoplasma negative, STI testing negative, Ureaplasma was not tested.  - Send urine specimen for further testing to confirm absence of Ureaplasma  - Advise daily fluid intake of 64 oz, primarily water  - Recommend elimination diet and provide diet sheet  - Suggest OTC aloe vera capsules for bladder soothing  - Prescribe Pyridium PRN, not for more than 3 consecutive days  - Contact office for an appointment and urine culture if UTI symptoms occur    Follow-up  - Scheduled follow-up in 3 months       Return in about 3 months (around 10/3/2025) for Nish.    Nish Rey, MSN, APRN, FNP-C  [facility]

## (undated) DEVICE — VLV SXN AIR/H2O ORCAPOD3 1P/U STRL

## (undated) DEVICE — SPNG GZ WOVN 4X4IN 12PLY 10/BX STRL

## (undated) DEVICE — GLV SURG SENSICARE W/ALOE PF LF 8 STRL

## (undated) DEVICE — BITEBLOCK SCOPESAVER LG LF

## (undated) DEVICE — Device

## (undated) DEVICE — STRIP,CLOSURE,WOUND,MEDI-STRIP,1/2X4: Brand: MEDLINE

## (undated) DEVICE — OCCLUSIVE GAUZE STRIP,3% BISMUTH TRIBROMOPHENATE IN PETROLATUM BLEND: Brand: XEROFORM

## (undated) DEVICE — GLV SURG TRIUMPH ORTHO W/ALOE PF LTX 8 STRL

## (undated) DEVICE — CONNECT PORT ENDO CLEVERCAP FOR OLYMPUS SCOPE 24HR

## (undated) DEVICE — FRCP BX RADJAW4 NDL 2.8 240 STD OG

## (undated) DEVICE — ADHS LIQ MASTISOL 2/3ML

## (undated) DEVICE — SUT VIC 2/0 CT1 27IN J259H

## (undated) DEVICE — DRSNG SURESITE WNDW 4X4.5

## (undated) DEVICE — 3M™ STERI-STRIP™ REINFORCED ADHESIVE SKIN CLOSURES, R1546, 1/4 IN X 4 IN (6 MM X 100 MM), 10 STRIPS/ENVELOPE: Brand: 3M™ STERI-STRIP™

## (undated) DEVICE — FLEXIBLE YANKAUER,MEDIUM TIP, NO VACUUM CONTROL: Brand: ARGYLE

## (undated) DEVICE — CUFF SCD HEMOFORCE SEQ CALF STD MD

## (undated) DEVICE — PK EXTRM UPPR 20

## (undated) DEVICE — ST TBG HYBRID CLEVERCAP FOR OLYMPUS SCOPE 24HR